# Patient Record
Sex: FEMALE | Race: WHITE | Employment: OTHER | URBAN - METROPOLITAN AREA
[De-identification: names, ages, dates, MRNs, and addresses within clinical notes are randomized per-mention and may not be internally consistent; named-entity substitution may affect disease eponyms.]

---

## 2017-08-15 ENCOUNTER — HOSPITAL ENCOUNTER (OUTPATIENT)
Dept: MAMMOGRAPHY | Age: 67
Discharge: HOME | End: 2017-08-15
Attending: FAMILY MEDICINE
Payer: MEDICARE

## 2017-08-15 DIAGNOSIS — Z12.31 VISIT FOR SCREENING MAMMOGRAM: ICD-10-CM

## 2017-08-15 PROCEDURE — 77063 BREAST TOMOSYNTHESIS BI: CPT

## 2018-08-16 ENCOUNTER — HOSPITAL ENCOUNTER (OUTPATIENT)
Dept: MAMMOGRAPHY | Age: 68
Discharge: HOME | End: 2018-08-16
Attending: FAMILY MEDICINE
Payer: MEDICARE

## 2018-08-16 DIAGNOSIS — Z12.31 VISIT FOR SCREENING MAMMOGRAM: ICD-10-CM

## 2018-08-16 PROCEDURE — 77063 BREAST TOMOSYNTHESIS BI: CPT

## 2019-01-18 ENCOUNTER — HOSPITAL ENCOUNTER (OUTPATIENT)
Dept: GENERAL RADIOLOGY | Age: 69
Discharge: HOME | End: 2019-01-18
Payer: MEDICARE

## 2019-01-18 DIAGNOSIS — G89.29 CHRONIC LUMBAR PAIN: ICD-10-CM

## 2019-01-18 DIAGNOSIS — M54.50 CHRONIC LUMBAR PAIN: ICD-10-CM

## 2019-01-18 PROCEDURE — 72100 X-RAY EXAM L-S SPINE 2/3 VWS: CPT

## 2019-10-02 ENCOUNTER — HOSPITAL ENCOUNTER (OUTPATIENT)
Dept: MAMMOGRAPHY | Age: 69
Discharge: HOME | End: 2019-10-02
Attending: FAMILY MEDICINE
Payer: MEDICARE

## 2019-10-02 DIAGNOSIS — Z12.31 VISIT FOR SCREENING MAMMOGRAM: ICD-10-CM

## 2019-10-02 PROCEDURE — 77063 BREAST TOMOSYNTHESIS BI: CPT

## 2020-04-29 ENCOUNTER — VIRTUAL VISIT (OUTPATIENT)
Dept: ORTHOPEDIC SURGERY | Age: 70
End: 2020-04-29

## 2020-04-29 DIAGNOSIS — M48.062 SPINAL STENOSIS OF LUMBAR REGION WITH NEUROGENIC CLAUDICATION: Primary | ICD-10-CM

## 2020-04-29 RX ORDER — COLESEVELAM 180 1/1
TABLET ORAL
COMMUNITY
Start: 2020-03-18

## 2020-04-29 RX ORDER — ESCITALOPRAM OXALATE 20 MG/1
TABLET ORAL
COMMUNITY
Start: 2020-03-06

## 2020-04-29 RX ORDER — GABAPENTIN 300 MG/1
300 CAPSULE ORAL 3 TIMES DAILY
Qty: 90 CAP | Refills: 5 | Status: SHIPPED | OUTPATIENT
Start: 2020-04-29 | End: 2020-06-02 | Stop reason: DRUGHIGH

## 2020-04-29 RX ORDER — ROSUVASTATIN CALCIUM 5 MG/1
2.5 TABLET, COATED ORAL DAILY
COMMUNITY
Start: 2020-02-08

## 2020-04-29 RX ORDER — TRIMETHOPRIM 100 MG/1
TABLET ORAL
COMMUNITY
Start: 2020-02-08

## 2020-04-29 RX ORDER — BACLOFEN 10 MG/1
TABLET ORAL
COMMUNITY
Start: 2020-03-05

## 2020-04-29 NOTE — PROGRESS NOTES
[address], [address]  [place], [address] [place] [address] Street  Phone: [phone]  Fax: [phone]        Duglas Baigar  : 1950  PCP: Jj Samaniego MD  2020    NEW PATIENT    Consent: Fidelia Garrett is a 68 y.o. female who was seen by synchronous (real-time) audio-video technology, and/or her healthcare decision maker, is aware that this patient-initiated, Telehealth encounter on 2020 is a billable service, with coverage as determined by his/her insurance carrier. He/She is aware that he/she may receive a bill and has provided verbal consent to proceed: Yes. The visit was conducted in the office with the patient being in home through a Doxy platform. Visit video time start: 11:04AM end: 11:41AM      HISTORY OF PRESENT ILLNESS  Fidelia Garrett is a 68 y.o. female c/o chronic low back pain and leg pain. Her issues have been worsening over time. She appears to have 2 separate issues. One is that she has back and leg pain(R>L) after standing for a period of time. 5-10 minutes is enough to encourage her to sit down. Afterwards, her symptoms appear to resolve. 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 This is more of an axial pain that causes an episodic sharp pain in her back she feels is consistent with muscle spasm. Her pain at rest is a 4/10. She has no associated weakness or sensory deficit. She has been prescribed muscle relaxers, but has not taken them. She is using tylenol with some benefit. She had some x-rays of the lumbar spine done over a year ago. It reportedly said that there was facet arthropathy and disc degeneration. ASSESSMENT  Her low back and leg pain may be multifactorial. 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  Her leg symptoms and flexed forward posture are likely related to spinal stenosis due to her claudication symptoms. Her arthropathy is likely asymptomatic at this time. PLAN  1. Gabapentin 300mg TID ramp  2. Physical therapy will be ordered once social distancing guidelines relax  3. Continue acetaminophen PRN  4. Continue HEP provided by PCP      Pt will f/u in 4 weeks or sooner if needed. Diagnoses and all orders for this visit:    1. Spinal stenosis of lumbar region with neurogenic claudication  -     gabapentin (Neurontin) 300 mg capsule; Take 1 Cap by mouth three (3) times daily. Max Daily Amount: 900 mg. CHIEF COMPLAINT  Jason Hopper is seen today in consultation at the request of Shu Bailey MD for complaints of low back and leg pain       PAST MEDICAL HISTORY   Past Medical History:   Diagnosis Date    Dysplasia of one kidney     Hyperlipidemia        Past Surgical History:   Procedure Laterality Date    HX HYSTERECTOMY         MEDICATIONS        ALLERGIES  No Known Allergies       SOCIAL HISTORY    Social History     Socioeconomic History    Marital status:      Spouse name: Not on file    Number of children: Not on file    Years of education: Not on file    Highest education level: Not on file   Tobacco Use    Smoking status: Never Smoker    Smokeless tobacco: Never Used   Substance and Sexual Activity    Alcohol use: Never     Frequency: Never    Drug use: Never       FAMILY HISTORY    Family History   Problem Relation Age of Onset    Hypertension Mother     Hypertension Father     Hypertension Sister     Diabetes Sister     Hypertension Brother     Diabetes Brother          REVIEW OF SYSTEMS  Review of Systems   Musculoskeletal: Positive for back pain. PHYSICAL EXAMINATION  There were no vitals taken for this visit.       No flowsheet data found.      -Constitutional: Healthy appearing, well developed, alert, in no acute distress  - Eyes: Conjunctiva clear, lids unremarkable, sclera anicteric  - Ears, nose, mouth, and throat: External inspection head, face, ears and nose identifies normal appearance without obvious deformity.  -Neck: No asymmetry, no masses, trachea is midline, and normal overall appearance.  -Respiratory: Respirations are even and unlabored. -Musculoskeletal: No cyanosis, clubbing, or edema observed  -Musculoskeletal: Able to walk without assistance with normal gait pattern  -Musculoskeletal: Inspection of the following identifies no gross deformity, misalignment, or asymmetry:   -Head/neck   -Spine   -Ribs/pelvis   -Right upper extremity    -Left upper extremity    -Right lower extremity  -Left lower extremity  -Musculoskeletal: Assessment of range of motion of the following identifies no gross limitations:    -Head/neck   -Spine   -Ribs/pelvis   -Right upper extremity    -Left upper extremity    -Right lower extremity  -Left lower extremity  -Musculoskeletal: lumbar extension is nonprovocative  -Musculoskeletal: flexed forward posture, difficulty with sit to stand transfer  -Skin: Head and neck skin with no lesions or rash  -Neurologic: Cranial nerves 3-12 grossly intact.  -Neurologic: negative SLR bilaterally  -Psychiatric: Judgement and insight intact. The limitations of a telemedicine visit including the inability to perform a detailed physical examination may miss significant findings was presented to the patient, and the patient still elected to proceed with the telemedicine visit. RADIOGRAPHS  None available for review     reviewed    Ms. Jocy Greene has a reminder for a \"due or due soon\" health maintenance. I have asked that she contact her primary care provider for follow-up on this health maintenance.      Pursuant to the emergency declaration under the [address] [place] [address] and the [employer] and [employer] Act, this Virtual  Visit was conducted, with patient's consent, to reduce the patient's risk of exposure to COVID-19 and provide continuity of care for an established patient. Services were provided through a video synchronous discussion virtually to substitute for in-person clinic visit.     CPT Codes 84010-44662 for Established Patients may apply to this Telehealth Visit  Time-based coding, delete if not needed: I spent at least 30 minutes with this new patient, and >50% of the time was spent counseling and/or coordinating care regarding low back and leg pain, spinal stenosis, medications, activity, HEP

## 2020-05-29 ENCOUNTER — TELEPHONE (OUTPATIENT)
Dept: ORTHOPEDIC SURGERY | Age: 70
End: 2020-05-29

## 2020-05-29 NOTE — TELEPHONE ENCOUNTER
Patient was to complete conservative treatment first. Needs FU with Dr. Jose Ramon Black before an MRI will be ordered

## 2020-05-29 NOTE — TELEPHONE ENCOUNTER
Pt called to make sure that order for mri was put in and that a fu be scheduled after.  Please call pt to advise she cancelled her 6/2/20 appt due to not having mri done yet

## 2020-06-02 ENCOUNTER — VIRTUAL VISIT (OUTPATIENT)
Dept: ORTHOPEDIC SURGERY | Age: 70
End: 2020-06-02

## 2020-06-02 DIAGNOSIS — G89.29 CHRONIC PRIMARY MUSCULOSKELETAL PAIN: ICD-10-CM

## 2020-06-02 DIAGNOSIS — M79.18 MYOFASCIAL PAIN: ICD-10-CM

## 2020-06-02 DIAGNOSIS — M54.59 MECHANICAL LOW BACK PAIN: ICD-10-CM

## 2020-06-02 DIAGNOSIS — M79.18 CHRONIC PRIMARY MUSCULOSKELETAL PAIN: ICD-10-CM

## 2020-06-02 DIAGNOSIS — M48.062 SPINAL STENOSIS OF LUMBAR REGION WITH NEUROGENIC CLAUDICATION: Primary | ICD-10-CM

## 2020-06-02 RX ORDER — GABAPENTIN 300 MG/1
600 CAPSULE ORAL 3 TIMES DAILY
Qty: 180 CAP | Refills: 2 | Status: SHIPPED | OUTPATIENT
Start: 2020-06-02 | End: 2020-11-12 | Stop reason: DRUGHIGH

## 2020-06-02 NOTE — PROGRESS NOTES
[address]  [address], [address]  [place], [address] [place] [address] Street  Phone: [phone]  Fax: [phone]        Georgie BarrazaCallaway  : 1950  PCP: Lilly Lagunas MD  2020    PROGRESS NOTE    Consent: Makenzie Faria is a 68 y.o. female who was seen by synchronous (real-time) audio-video technology, and/or her healthcare decision maker, is aware that this patient-initiated, Telehealth encounter on 2020 is a billable service, with coverage as determined by his/her insurance carrier. He/She is aware that he/she may receive a bill and has provided verbal consent to proceed: Yes. The visit was conducted in the office with the patient being in home through a Doxy platform. Visit video time start: 1:25 PM end: 1:36 PM      HISTORY OF PRESENT ILLNESS  Makenzie Faria is a 68 y.o. female who was seen as a new patient 2020 with c/o chronic low back pain and leg pain. Her issues were progressive. She appeared to have 2 separate issues. One is that she had back and leg pain(R>L) after standing for a period of time. 5-10 minutes was enough to encourage her to sit down. Afterwards, her symptoms appeared to resolve. 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 This was more of an axial pain that caused an episodic sharp pain in her back she felt was consistent with muscle spasm. Her pain at rest was a 4/10. She had no associated weakness or sensory deficit. She had been prescribed muscle relaxers, but had not taken them. She was using tylenol with some benefit. She had some x-rays of the lumbar spine done over a year ago. It reportedly said that there was facet arthropathy and disc degeneration. Makenzie Faria is seen virtually for f/u. She has increased her Gabapentin at night to 600 mg but continues to take 300 mg during the morning and day. She noticed some improvement, but continues to have pain. Pain Scale: /10    Treatments patient has tried:  Physical therapy:Pending  Doing HEP: Unknown  Non-opioid medications: Yes  Spinal injections: No  Spinal surgery- No.   Last Lumbar MRI - None     reviewed. ASSESSMENT  This is a 68year-old female with lumbar pain radiating into the BLE (R>L). Her low back and leg pain may be multifactorial. 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 Her leg symptoms and flexed forward posture are likely related to spinal stenosis due to her claudication symptoms. Her arthropathy is likely asymptomatic at this time. PLAN  1. Referral to PT [facility])  2. Increase Gabapentin to 600 mg TID    Pt will f/u in 8 weeks or sooner as needed. Diagnoses and all orders for this visit:    1. Spinal stenosis of lumbar region with neurogenic claudication  -     REFERRAL TO PHYSICAL THERAPY  -     gabapentin (NEURONTIN) 300 mg capsule; Take 2 Caps by mouth three (3) times daily. Max Daily Amount: 1,800 mg.    2. Chronic primary musculoskeletal pain  -     REFERRAL TO PHYSICAL THERAPY    3. Myofascial pain  -     REFERRAL TO PHYSICAL THERAPY    4. Mechanical low back pain  -     REFERRAL TO PHYSICAL THERAPY       Follow-up and Dispositions    · Return in about 8 weeks (around 7/28/2020). PAST MEDICAL HISTORY   Past Medical History:   Diagnosis Date    Dysplasia of one kidney     Hyperlipidemia        Past Surgical History:   Procedure Laterality Date    HX HYSTERECTOMY      HX OOPHORECTOMY     . MEDICATIONS      Current Outpatient Medications   Medication Sig Dispense Refill    gabapentin (NEURONTIN) 300 mg capsule Take 2 Caps by mouth three (3) times daily.  Max Daily Amount: 1,800 mg. 180 Cap 2    baclofen (LIORESAL) 10 mg tablet TAKE 1 TABLET BY MOUTH THREE TIMES DAILY AS NEEDED      colesevelam (WELCHOL) 625 mg tablet       escitalopram oxalate (LEXAPRO) 20 mg tablet       rosuvastatin (CRESTOR) 5 mg tablet       trimethoprim (TRIMPEX) 100 mg tablet           ALLERGIES  No Known Allergies       SOCIAL HISTORY    Social History     Socioeconomic History    Marital status:      Spouse name: Not on file    Number of children: Not on file    Years of education: Not on file    Highest education level: Not on file   Tobacco Use    Smoking status: Never Smoker    Smokeless tobacco: Never Used   Substance and Sexual Activity    Alcohol use: Never     Frequency: Never    Drug use: Never   Social History Narrative    ** Merged History Encounter **            FAMILY HISTORY    Family History   Problem Relation Age of Onset    Hypertension Mother     Hypertension Father     Hypertension Sister     Diabetes Sister     Hypertension Brother     Diabetes Brother          REVIEW OF SYSTEMS  Review of Systems   Musculoskeletal: Positive for back pain. BLE paraesthesia (R>L)        PHYSICAL EXAMINATION  There were no vitals taken for this visit. No flowsheet data found.        -Constitutional: Healthy appearing, well developed, alert, in no acute distress  - Eyes: Conjunctiva clear, lids unremarkable, sclera anicteric  - Ears, nose, mouth, and throat: External inspection head, face, ears and nose identifies normal appearance without obvious deformity.  -Neck: No asymmetry, no masses, trachea is midline, and normal overall appearance.  -Respiratory: Respirations are even and unlabored. -Musculoskeletal: No cyanosis, clubbing, or edema observed  -Musculoskeletal: Inspection of the following identifies no gross deformity, misalignment, or asymmetry:   -Head/neck   -Spine   -Ribs/pelvis   -Right upper extremity    -Left upper extremity  -Musculoskeletal: Assessment of range of motion of the following identifies no gross limitations:    -Head/neck   -Spine   -Ribs/pelvis   -Skin: Head and neck skin with no lesions or rash  -Neurologic: Cranial nerves 3-12 grossly intact.   -Psychiatric: Judgement and insight intact. The limitations of a telemedicine visit including the inability to perform a detailed physical examination may miss significant findings was presented to the patient, and the patient still elected to proceed with the telemedicine visit. RADIOGRAPHS  Lumbar XR images taken on 1/18/19 personally reviewed with patient:  3 views of the lumbar spine obtained. Lumbar dextrocurvature apex L2,  slightly limits exam. Vertebral body heights appear preserved. Multilevel mild  disc space loss and chronic appearing endplate changes. Mild retrolisthesis of  L2 on L3 and L4 and L5, difficult to measure due to obliquity of lateral view. Lumbar lordosis maintained. Lower lumbar facet arthropathy. Multilevel mid to  lower lumbar spinous process abutment.     IMPRESSION  IMPRESSION:     No clearly acute findings. Degenerative changes as above. Mild levocurvature  apex L2. Mild retrolisthesis of L2 on L3 and L4 on L5.  reviewed    Ms. Miriam Ortiz has a reminder for a \"due or due soon\" health maintenance. I have asked that she contact her primary care provider for follow-up on this health maintenance. Pursuant to the emergency declaration under the [address] [place] [address], [facility] waiver authority and the [employer] Act, this Virtual  Visit was conducted, with patient's consent, to reduce the patient's risk of exposure to COVID-19 and provide continuity of care for an established patient. Services were provided through a video synchronous discussion virtually to substitute for in-person clinic visit. CPT Codes 36122-24684 for Established Patients may apply to this Telehealth Visit  Time-based coding, delete if not needed: I spent at least 10 minutes with this established patient, and >50% of the time was spent counseling and/or coordinating care. Written by Indigo Torres, as dictated by Dr. Elizabeth Ronquillo. I, Dr. Madeline Aden, confirm that all documentation is accurate.

## 2020-06-02 NOTE — PATIENT INSTRUCTIONS
Gabapentin (Neurontin) instructions: We will increase your current dose from 300 mg (1 pill) three times a day to 600 mg (2 pills) three times a day. Morning Afternoon Night Week 1 1 pill 1 pill 2 pills Week 2 2 pills 1 pill 2 pills Week 3 and onwards 2 pills 2 pills 2 pills Week 1: Take an additional 300 mg pill at night to your current dose so that you are taking 1 pill in the morning, 1 pill in the afternoon, and 2 pills at night. Week 2: Take an additional 300 mg pill in the morning so that you are taking 2 pills in the morning, 1 pill in the afternoon, and 2 pills at night. Week 3 and onwards: Take an additional 300 mg pill in the afternoon so that you are taking 2 pills in the morning, 2 pills in the afternoon, and 2 pills at night. Continue taking this dose each day.

## 2020-06-08 ENCOUNTER — HOSPITAL ENCOUNTER (OUTPATIENT)
Dept: PHYSICAL THERAPY | Age: 70
Discharge: HOME | End: 2020-06-08
Payer: MEDICARE

## 2020-06-08 PROCEDURE — 97535 SELF CARE MNGMENT TRAINING: CPT

## 2020-06-08 PROCEDURE — 97162 PT EVAL MOD COMPLEX 30 MIN: CPT

## 2020-06-08 PROCEDURE — 97110 THERAPEUTIC EXERCISES: CPT

## 2020-06-08 NOTE — PROGRESS NOTES
PT DAILY TREATMENT NOTE 10-18    Patient Name: Anamaria Fung  Date:2020  : 1950  [x]  Patient  Verified  Payor: [place] MEDICARE / Plan: [place] MEDICARE PART A & B / Product Type: Medicare /    In PFIL:0523  Out time:1000  Total Treatment Time (min): 45  Visit #: 1 of -8    Medicare/BCBS Only   Total Timed Codes (min):  25 1:1 Treatment Time:  45       Treatment Area: Spinal stenosis, lumbar region with neurogenic claudication [M48.062]  Myalgia, other site [M79.18]  Low back pain [M54.5]    SUBJECTIVE  Pain Level (0-10 scale):  2  Any medication changes, allergies to medications, adverse drug reactions, diagnosis change, or new procedure performed?: [x] No    [] Yes (see summary sheet for update)  Subjective functional status/changes:   [] No changes reported       OBJECTIVE    Modality rationale:    Min Type Additional Details    [] Estim:  []Unatt       []IFC  []Premod                        []Other:  []w/ice   []w/heat  Position:  Location:    [] Estim: []Att    []TENS instruct  []NMES                    []Other:  []w/US   []w/ice   []w/heat  Position:  Location:    []  Traction: [] Cervical       []Lumbar                       [] Prone          []Supine                       []Intermittent   []Continuous Lbs:  [] before manual  [] after manual    []  Ultrasound: []Continuous   [] Pulsed                           []1MHz   []3MHz W/cm2:  Location:    []  Iontophoresis with dexamethasone         Location: [] Take home patch   [] In clinic    []  Ice     []  heat  []  Ice massage  []  Laser   []  Anodyne Position:  Location:    []  Laser with stim  []  Other:  Position:  Location:    []  Vasopneumatic Device Pressure:       [] lo [] med [] hi   Temperature: [] lo [] med [] hi   [] Skin assessment post-treatment:  []intact []redness- no adverse reaction    []redness  adverse reaction:     20 min [x]Eval                  []Re-Eval       17 min Therapeutic Exercise:  [] See flow sheet : HEP   Rationale: increase ROM and increase strength to improve the patients ability to perform ADL    8 min Self Care/Home Management: body mechanics, pain control with modalities at home   Rationale: decrease pain  to improve the patients ability to perform ADL            With   [] TE   [] TA   [] neuro   [] other: Patient Education: [x] Review HEP    [] Progressed/Changed HEP based on:   [] positioning   [x] body mechanics   [] transfers   [x] heat/ice application    [] other:      Other Objective/Functional Measures:       Pain Level (0-10 scale) post treatment: 2    ASSESSMENT/Changes in Function:      Patient will continue to benefit from skilled PT services to modify and progress therapeutic interventions, address functional mobility deficits, address ROM deficits, address strength deficits, analyze and address soft tissue restrictions, analyze and cue movement patterns and analyze and modify body mechanics/ergonomics to attain remaining goals. [x]  See Plan of Care  []  See progress note/recertification  []  See Discharge Summary         Progress towards goals / Updated goals:  Short Term Goals: To be accomplished in 1 weeks:   1. Pt will be I and compliant with HEP   IE- issued HEP  Long Term Goals: To be accomplished in 4 weeks:   1. Improve FOTO score to 59 for improved ability for daily tasks. IE- 48   2. The pt will report being able to perform moderate activities at home (like vacuuming) with no limitation   IE- limited a little. 3. The pt will demonstrate 4/5 glute max strength to diminish lumbar strain and improve ability for daily activities. IE- 3+/5    4. The pt will report no difficulty with performing her usual housework   IE- limited a little. PLAN  []  Upgrade activities as tolerated     [x]  Continue plan of care  []  Update interventions per flow sheet       []  Discharge due to:_  []  Other:_      Cliff Parents, PT 6/8/2020  9:15 AM    No future appointments.

## 2020-06-08 NOTE — PROGRESS NOTES
[facility]  [address]  [place], [address]  [phone] fax    Plan of Care/ Statement of Necessity for Physical Therapy Services    Patient name: Matthew Gresham Start of Care: 2020   Referral source: Ning Peralta MD : 1950    Medical Diagnosis: Spinal stenosis, lumbar region with neurogenic claudication [M48.062]  Myalgia, other site [M79.18]  Low back pain [M54.5]  Payor: [place] MEDICARE / Plan: VA MEDICARE PART A & B / Product Type: Medicare /  Onset Date:2020    Treatment Diagnosis: LBP   Prior Hospitalization: see medical history Provider#: [id number]   Medications: Verified on Patient summary List    Comorbidities: OA, depression   Prior Level of Function: functionally I with daily activities. The Plan of Care and following information is based on the information from the initial evaluation. Assessment/ key information: 69 y/o female presents with c/o LBP and pain down B LEs into her feet. She denies numbness or tingling. 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 The pt demonstrates limited lumbar AROM in all planes of motion with pain with all planes. The pt demonstrates limited B hip mobility, limited B LE flexibility, limited core engagement, limited B glute strength, + tenderness to palpation noted over the lumbar paraspinals,and decreased segmental mobility noted. The pt will benefit from PT to address the aforementioned impairments. Evaluation Complexity History MEDIUM  Complexity : 1-2 comorbidities / personal factors will impact the outcome/ POC ; Examination MEDIUM Complexity : 3 Standardized tests and measures addressing body structure, function, activity limitation and / or participation in recreation  ;Presentation MEDIUM Complexity : Evolving with changing characteristics  ; Clinical Decision Making MEDIUM Complexity : FOTO score of 26-74  Overall Complexity Rating: MEDIUM  Problem List: pain affecting function, decrease ROM, decrease strength, impaired gait/ balance, decrease ADL/ functional abilitiies, decrease activity tolerance and decrease flexibility/ joint mobility   Treatment Plan may include any combination of the following: Therapeutic exercise, Therapeutic activities, Neuromuscular re-education, Physical agent/modality, Manual therapy, Patient education and Self Care training  Patient / Family readiness to learn indicated by: asking questions, trying to perform skills and interest  Persons(s) to be included in education: patient (P)  Barriers to Learning/Limitations: None  Patient Goal (s): To have less pain  Patient Self Reported Health Status: good  Rehabilitation Potential: good    Short Term Goals: To be accomplished in 1 weeks:   1. Pt will be I and compliant with HEP     Long Term Goals: To be accomplished in 4 weeks:   1. Improve FOTO score to 59 for improved ability for daily tasks. 2. The pt will report being able to perform moderate activities at home (like vacuuming) with no limitation   3. The pt will demonstrate 4/5 glute max strength to diminish lumbar strain and improve ability for daily activities. 4. The pt will report no difficulty with performing her usual housework    Frequency / Duration: Patient to be seen 2 times per week for 4 weeks. Patient/ Caregiver education and instruction: Diagnosis, prognosis, self care, activity modification and exercises   [x]  Plan of care has been reviewed with PTA    Certification Period: 6-8-2020 to 7-7-2020  Leonarda Rees, PT 6/8/2020 9:16 AM    ________________________________________________________________________    I certify that the above Therapy Services are being furnished while the patient is under my care. I agree with the treatment plan and certify that this therapy is necessary.     [de-identified] Signature:____________________  Date:____________Time: _________    Please sign and return to [facility]  [address]  [place], [address]  [phone] fax

## 2020-06-12 ENCOUNTER — HOSPITAL ENCOUNTER (OUTPATIENT)
Dept: PHYSICAL THERAPY | Age: 70
Discharge: HOME | End: 2020-06-12
Payer: MEDICARE

## 2020-06-12 PROCEDURE — 97140 MANUAL THERAPY 1/> REGIONS: CPT

## 2020-06-12 PROCEDURE — 97110 THERAPEUTIC EXERCISES: CPT

## 2020-06-12 NOTE — PROGRESS NOTES
PT DAILY TREATMENT NOTE 10-18    Patient Name: Martinez Caldwell  Date:2020  : 1950  [x]  Patient  Verified  Payor: M[employer] Alt / Plan: [place] / Product Type: Medicare /    In time:11:14  Out time:12:10  Total Treatment Time (min): 56  Visit #: 2 of 4-8    Medicare/BCBS Only   Total Timed Codes (min):  46 1:1 Treatment Time:  35       Treatment Area: Low back pain [M54.5]    SUBJECTIVE  Pain Level (0-10 scale): 2  Any medication changes, allergies to medications, adverse drug reactions, diagnosis change, or new procedure performed?: [x] No    [] Yes (see summary sheet for update)  Subjective functional status/changes:   [] No changes reported  The pt reports she has been trying HEP.      OBJECTIVE    Modality rationale: decrease pain and increase tissue extensibility to improve the patients ability to perform ADL   Min Type Additional Details    [] Estim:  []Unatt       []IFC  []Premod                        []Other:  []w/ice   []w/heat  Position:  Location:    [] Estim: []Att    []TENS instruct  []NMES                    []Other:  []w/US   []w/ice   []w/heat  Position:  Location:    []  Traction: [] Cervical       []Lumbar                       [] Prone          []Supine                       []Intermittent   []Continuous Lbs:  [] before manual  [] after manual    []  Ultrasound: []Continuous   [] Pulsed                           []1MHz   []3MHz W/cm2:  Location:    []  Iontophoresis with dexamethasone         Location: [] Take home patch   [] In clinic   10 []  Ice     [x]  heat  []  Ice massage  []  Laser   []  Anodyne Position:prone  Location:lumbar    []  Laser with stim  []  Other:  Position:  Location:    []  Vasopneumatic Device Pressure:       [] lo [] med [] hi   Temperature: [] lo [] med [] hi   [] Skin assessment post-treatment:  []intact []redness- no adverse reaction    []redness  adverse reaction:     30 min Therapeutic Exercise:  [x] See flow sheet :   Rationale: increase ROM and increase strength to improve the patients ability to perform ADL     8 min Neuromuscular Re-education:  [x]  See flow sheet : Trap bar exercises, PPT for engagement   Rationale: increase strength, improve coordination and increase proprioception  to improve the patients ability to perform ADL    8 min Manual Therapy:  Lumbar PAs, STM paraspinals   Rationale: decrease pain, increase ROM and increase tissue extensibility to perform daily tasks. With   [] TE   [] TA   [] neuro   [] other: Patient Education: [x] Review HEP    [] Progressed/Changed HEP based on:   [] positioning   [] body mechanics   [] transfers   [] heat/ice application    [] other:      Other Objective/Functional Measures: initiated therex per flow sheet, + cues for engagement of core     Pain Level (0-10 scale) post treatment: 2    ASSESSMENT/Changes in Function:  The pt has difficulty engaging core with therex, requiring cues. No complaint during treatment of pain increase or discomfort. Patient will continue to benefit from skilled PT services to modify and progress therapeutic interventions, address functional mobility deficits, address ROM deficits, address strength deficits, analyze and address soft tissue restrictions, analyze and cue movement patterns and analyze and modify body mechanics/ergonomics to attain remaining goals. []  See Plan of Care  []  See progress note/recertification  []  See Discharge Summary         Progress towards goals / Updated goals:  Short Term Goals: To be accomplished in 1 weeks:              1. Pt will be I and compliant with HEP              IE- issued HEP   Current: reports compliance on 6-12-20  Long Term Goals: To be accomplished in 4 weeks:              1. Improve FOTO score to 59 for improved ability for daily tasks.                IE- 48              2. The pt will report being able to perform moderate activities at home (like vacuuming) with no limitation IE- limited a little. 3. The pt will demonstrate 4/5 glute max strength to diminish lumbar strain and improve ability for daily activities.               IE- 3+/5                         4. The pt will report no difficulty with performing her usual housework              IE- limited a little.        PLAN  []  Upgrade activities as tolerated     [x]  Continue plan of care  []  Update interventions per flow sheet       []  Discharge due to:_  []  Other:_      Tangela Seo, PT 6/12/2020  11:35 AM    Future Appointments   Date Time Provider Joann Ambrizi   6/15/2020 10:15 AM Tahir, [address] [facility]   6/17/2020  9:45 AM Leda Friends [facility]   6/22/2020  9:15 AM Korina Courtney, PT [facility]   6/24/2020  9:45 AM Korina Courtney, PT [facility]   6/29/2020  9:15 AM Korina Courtney, PT [facility]   7/1/2020  9:45 AM Arline Gay, PT [facility]

## 2020-06-15 ENCOUNTER — HOSPITAL ENCOUNTER (OUTPATIENT)
Dept: PHYSICAL THERAPY | Age: 70
Discharge: HOME | End: 2020-06-15
Payer: MEDICARE

## 2020-06-15 PROCEDURE — 97140 MANUAL THERAPY 1/> REGIONS: CPT

## 2020-06-15 PROCEDURE — 97112 NEUROMUSCULAR REEDUCATION: CPT

## 2020-06-15 PROCEDURE — 97110 THERAPEUTIC EXERCISES: CPT

## 2020-06-15 NOTE — PROGRESS NOTES
PT DAILY TREATMENT NOTE 10-18    Patient Name: Ayesha Jarrett  Date:6/15/2020  : 1950  [x]  Patient  Verified  Payor: Bonnie Lopezors / Plan: [place] MEDICARE PART A & B / Product Type: Medicare /    In time:10:12  Out time:10:51  Total Treatment Time (min): 39  Visit #: 3 of 4-8    Medicare/BCBS Only   Total Timed Codes (min):  39 1:1 Treatment Time:  39       Treatment Area: Low back pain [M54.5]    SUBJECTIVE  Pain Level (0-10 scale): 2  Any medication changes, allergies to medications, adverse drug reactions, diagnosis change, or new procedure performed?: [x] No    [] Yes (see summary sheet for update)  Subjective functional status/changes:   [] No changes reported  The pt reports no adverse response to first f/u session. Reports she can get out of a chair easier without \"wanting to cry\".  She reports arthritis which may be contributing to pain today    OBJECTIVE    Modality rationale: PD to improve the patients ability to    Min Type Additional Details    [] Estim:  []Unatt       []IFC  []Premod                        []Other:  []w/ice   []w/heat  Position:  Location:    [] Estim: []Att    []TENS instruct  []NMES                    []Other:  []w/US   []w/ice   []w/heat  Position:  Location:    []  Traction: [] Cervical       []Lumbar                       [] Prone          []Supine                       []Intermittent   []Continuous Lbs:  [] before manual  [] after manual    []  Ultrasound: []Continuous   [] Pulsed                           []1MHz   []3MHz W/cm2:  Location:    []  Iontophoresis with dexamethasone         Location: [] Take home patch   [] In clinic    []  Ice     []  heat  []  Ice massage  []  Laser   []  Anodyne Position:  Location:    []  Laser with stim  []  Other:  Position:  Location:    []  Vasopneumatic Device Pressure:       [] lo [] med [] hi   Temperature: [] lo [] med [] hi   [] Skin assessment post-treatment:  []intact []redness- no adverse reaction    []redness  adverse reaction: 23 min Therapeutic Exercise:  [] See flow sheet :   Rationale: increase ROM and increase strength to improve the patients ability to improve ease of daily task performance      8 min Neuromuscular Re-education:  []  See flow sheet : trap bar ex's, PPT   Rationale: improve coordination and increase proprioception  to improve the patients ability to activate anterior chain musculature for reduced lumbar strain with functional tasks     8 min Manual Therapy:  Lumbar PA's grade II-III, STM B lumbar paraspinals (pt in prone declined pillow prop)   Rationale: increase ROM and increase tissue extensibility to improve ease of ADL performance and self care       With   [] TE   [] TA   [] neuro   [] other: Patient Education: [x] Review HEP    [] Progressed/Changed HEP based on:   [] positioning   [] body mechanics   [] transfers   [] heat/ice application    [] other:      Other Objective/Functional Measures: pt challenged with PPT unable to segmental control lumbar spine     Pain Level (0-10 scale) post treatment: 0    ASSESSMENT/Changes in Function: Pt with good response to all therex today reports no pain at end of session. She does have difficulty with segmental lumbar mobility and anterior chain activation. Continue to progress lumbar flexibility and anterior chain strength as able    Patient will continue to benefit from skilled PT services to modify and progress therapeutic interventions, address functional mobility deficits, address ROM deficits, address strength deficits, analyze and address soft tissue restrictions, analyze and cue movement patterns and analyze and modify body mechanics/ergonomics to attain remaining goals. []  See Plan of Care  []  See progress note/recertification  []  See Discharge Summary         Progress towards goals / Updated goals:  Short Term Goals: To be accomplished in 1 weeks:              1.  Pt will be I and compliant with HEP              IE- issued HEP              Current: reports compliance on 6-12-20  Long Term Goals: To be accomplished in 4 weeks:              1. Improve FOTO score to 59 for improved ability for daily tasks.               IE- 53              2. The pt will report being able to perform moderate activities at home (like vacuuming) with no limitation              IE- limited a little.              3. The pt will demonstrate 4/5 glute max strength to diminish lumbar strain and improve ability for daily activities.             GG- 3+/5                         4. The pt will report no difficulty with performing her usual housework              IE- limited a little.      PLAN  []  Upgrade activities as tolerated     []  Continue plan of care  []  Update interventions per flow sheet       []  Discharge due to:_  []  Other:_      Ralph Jarrett DPT, CMTPT  6/15/2020  10:20 AM    Future Appointments   Date Time Provider Joann Yates   6/17/2020  9:45 AM [facility]   6/22/2020  9:15 AM Korina Courtney, PT [facility]   6/24/2020  9:45 AM Korina Courtney, PT [facility]   6/29/2020  9:15 AM Korina Courtney, PT [facility]   7/1/2020  9:45 AM Arline Gay, PT [facility]

## 2020-06-17 ENCOUNTER — HOSPITAL ENCOUNTER (OUTPATIENT)
Dept: PHYSICAL THERAPY | Age: 70
Discharge: HOME | End: 2020-06-17
Payer: MEDICARE

## 2020-06-17 PROCEDURE — 97110 THERAPEUTIC EXERCISES: CPT

## 2020-06-17 PROCEDURE — 97140 MANUAL THERAPY 1/> REGIONS: CPT

## 2020-06-17 NOTE — PROGRESS NOTES
PT DAILY TREATMENT NOTE 10-18    Patient Name: Bette Dale  Date:2020  : 1950  [x]  Patient  Verified  Payor: [place] MEDICARE / Plan: [place] MEDICARE PART A & B / Product Type: Medicare /    In time:9:46  Out time:10:19  Total Treatment Time (min): 33  Visit #: 4 of 4-8    Medicare/BCBS Only   Total Timed Codes (min):  33 1:1 Treatment Time:  30       Treatment Area: Low back pain [M54.5]    SUBJECTIVE  Pain Level (0-10 scale): 1  Any medication changes, allergies to medications, adverse drug reactions, diagnosis change, or new procedure performed?: [x] No    [] Yes (see summary sheet for update)  Subjective functional status/changes:   [] No changes reported  The pt reports some improved pain levels today, no issues since last session    OBJECTIVE    Modality rationale: PD to improve the patients ability to    Min Type Additional Details    [] Estim:  []Unatt       []IFC  []Premod                        []Other:  []w/ice   []w/heat  Position:  Location:    [] Estim: []Att    []TENS instruct  []NMES                    []Other:  []w/US   []w/ice   []w/heat  Position:  Location:    []  Traction: [] Cervical       []Lumbar                       [] Prone          []Supine                       []Intermittent   []Continuous Lbs:  [] before manual  [] after manual    []  Ultrasound: []Continuous   [] Pulsed                           []1MHz   []3MHz W/cm2:  Location:    []  Iontophoresis with dexamethasone         Location: [] Take home patch   [] In clinic    []  Ice     []  heat  []  Ice massage  []  Laser   []  Anodyne Position:  Location:    []  Laser with stim  []  Other:  Position:  Location:    []  Vasopneumatic Device Pressure:       [] lo [] med [] hi   Temperature: [] lo [] med [] hi   [] Skin assessment post-treatment:  []intact []redness- no adverse reaction    []redness  adverse reaction:       17 min Therapeutic Exercise:  [] See flow sheet :   Rationale: increase ROM and increase strength to improve the patients ability to improve daily task performance with less back pain    8 min Neuromuscular Re-education:  []  See flow sheet : standing trap bar ex's    Rationale: increase strength, improve coordination and increase proprioception  to improve the patients trunk endurance and core stability to reduce stress on lumbar spine     8 min Manual Therapy:  Lumbar STM B paraspinals, PAs and UPA's lumbar spine (all in prone, pt declined pillow)   Rationale: increase ROM and increase tissue extensibility to improve ease of ADLs and self care performance            With   [] TE   [] TA   [] neuro   [] other: Patient Education: [x] Review HEP    [] Progressed/Changed HEP based on:   [] positioning   [] body mechanics   [] transfers   [] heat/ice application    [] other:      Other Objective/Functional Measures: pt with one instance of \"shooting\" pain through right lumbar region during [facility] performance, subsided with brief rest     Pain Level (0-10 scale) post treatment: 0    ASSESSMENT/Changes in Function: The pt was able to complete session with decreased pain post despite brief sharp pain with SKC. She demonstrates overall improving pain levels it appears. Will continue with core stability work and lumbar flexibility for improved ADLs    Patient will continue to benefit from skilled PT services to modify and progress therapeutic interventions, address functional mobility deficits, address ROM deficits, address strength deficits, analyze and address soft tissue restrictions, analyze and cue movement patterns and analyze and modify body mechanics/ergonomics to attain remaining goals. []  See Plan of Care  []  See progress note/recertification  []  See Discharge Summary         Progress towards goals / Updated goals:  Short Term Goals: To be accomplished in 1 weeks:              1.  Pt will be I and compliant with HEP              IE- issued HEP              MIGFLRT: reports compliance on 6-12-20  Long Term Goals: To be accomplished in 4 weeks:              1. Improve FOTO score to 59 for improved ability for daily tasks.               IE- 53              2. The pt will report being able to perform moderate activities at home (like vacuuming) with no limitation              IE- limited a little.              3. The pt will demonstrate 4/5 glute max strength to diminish lumbar strain and improve ability for daily activities.             RI- 3+/5                         4. The pt will report no difficulty with performing her usual housework              IE- limited a little.      PLAN  []  Upgrade activities as tolerated     [x]  Continue plan of care  []  Update interventions per flow sheet       []  Discharge due to:_  []  Other:_      Shabbir Calvert DPT, CMTPT 6/17/2020  10:03 AM    Future Appointments   Date Time Provider Joann Yates   6/22/2020  9:15 AM Almeta Merino, PT [facility]   6/24/2020  9:45 AM Almeta Merino, PT [facility]   6/29/2020  9:15 AM Almeta Merino, PT [facility]   7/1/2020  9:45 AM Marizol Pillai, PT [facility]

## 2020-06-22 ENCOUNTER — HOSPITAL ENCOUNTER (OUTPATIENT)
Dept: PHYSICAL THERAPY | Age: 70
Discharge: HOME | End: 2020-06-22
Payer: MEDICARE

## 2020-06-22 PROCEDURE — 97110 THERAPEUTIC EXERCISES: CPT

## 2020-06-22 PROCEDURE — 97140 MANUAL THERAPY 1/> REGIONS: CPT

## 2020-06-22 NOTE — PROGRESS NOTES
PT DAILY TREATMENT NOTE 10-18    Patient Name: David Nieves  Date:2020  : 1950  [x]  Patient  Verified  Payor: Gloria Powell / Plan: [place] MEDICARE PART A & B / Product Type: Medicare /    In [id number]  Out [id number]  Total Treatment Time (min): 35  Visit #: 5 of 8    Medicare/BCBS Only   Total Timed Codes (min):  35 1:1 Treatment Time:  35       Treatment Area: Low back pain [M54.5]    SUBJECTIVE  Pain Level (0-10 scale): 2  Any medication changes, allergies to medications, adverse drug reactions, diagnosis change, or new procedure performed?: [x] No    [] Yes (see summary sheet for update)  Subjective functional status/changes:   [] No changes reported  The pt reports last Wednesday she had pain increase and had another episode of pain increase after sitting for a while over the weekend. OBJECTIVE    Modality rationale:    Min Type Additional Details    [] Estim:  []Unatt       []IFC  []Premod                        []Other:  []w/ice   []w/heat  Position:  Location:    [] Estim: []Att    []TENS instruct  []NMES                    []Other:  []w/US   []w/ice   []w/heat  Position:  Location:    []  Traction: [] Cervical       []Lumbar                       [] Prone          []Supine                       []Intermittent   []Continuous Lbs:  [] before manual  [] after manual    []  Ultrasound: []Continuous   [] Pulsed                           []1MHz   []3MHz W/cm2:  Location:    []  Iontophoresis with dexamethasone         Location: [] Take home patch   [] In clinic    []  Ice     []  heat  []  Ice massage  []  Laser   []  Anodyne Position:  Location:    []  Laser with stim  []  Other:  Position:  Location:    []  Vasopneumatic Device Pressure:       [] lo [] med [] hi   Temperature: [] lo [] med [] hi   [] Skin assessment post-treatment:  []intact []redness- no adverse reaction    []redness  adverse reaction:       19 min Therapeutic Exercise:  []?  See flow sheet :   Rationale: increase ROM and increase strength to improve the patients ability to improve daily task performance with less back pain     8 min Neuromuscular Re-education:  []? See flow sheet : standing trap bar ex's    Rationale: increase strength, improve coordination and increase proprioception  to improve the patients trunk endurance and core stability to reduce stress on lumbar spine      8 min Manual Therapy:  Lumbar STM B paraspinals, PAs lumbar spine   Rationale: increase ROM and increase tissue extensibility to improve ease of ADLs and self care performance            With   [] TE   [] TA   [] neuro   [] other: Patient Education: [x] Review HEP    [] Progressed/Changed HEP based on:   [] positioning   [] body mechanics   [] transfers   [] heat/ice application    [] other:      Other Objective/Functional Measures: + cues with therex for appropriate form/technique     Pain Level (0-10 scale) post treatment: 1    ASSESSMENT/Changes in Function:  The pt is gaining relief with PT treatment sessions but does reports some episodes of increase pain still. Patient will continue to benefit from skilled PT services to modify and progress therapeutic interventions, address functional mobility deficits, address ROM deficits, address strength deficits, analyze and address soft tissue restrictions, analyze and cue movement patterns and analyze and modify body mechanics/ergonomics to attain remaining goals. []  See Plan of Care  []  See progress note/recertification  []  See Discharge Summary         Progress towards goals / Updated goals:  Short Term Goals: To be accomplished in 1 weeks:              1. Pt will be I and compliant with HEP              IE- issued HEP              QMIGHGH: reports compliance on 6-12-20  Long Term Goals: To be accomplished in 4 weeks:              1. Improve FOTO score to 59 for improved ability for daily tasks.               IE- 53              2.  The pt will report being able to perform moderate activities at home (like vacuuming) with no limitation              IE- limited a little.              3. The pt will demonstrate 4/5 glute max strength to diminish lumbar strain and improve ability for daily activities.             XD- 3+/5     Current: remains 3+/5 on 6-22-20                       4. The pt will report no difficulty with performing her usual housework              IE- limited a little.      PLAN  []  Upgrade activities as tolerated     [x]  Continue plan of care  []  Update interventions per flow sheet       []  Discharge due to:_  []  Other:_      Aneta Uribe, PT 6/22/2020  9:22 AM    Future Appointments   Date Time Provider Joann Yates   6[facility]24[facility]2020  [facility]   [facility]2020  9[facility]15 [facility]   7/1/2020  9:45 AM [facility]

## 2020-06-24 ENCOUNTER — HOSPITAL ENCOUNTER (OUTPATIENT)
Dept: PHYSICAL THERAPY | Age: 70
Discharge: HOME | End: 2020-06-24
Payer: MEDICARE

## 2020-06-24 PROCEDURE — 97112 NEUROMUSCULAR REEDUCATION: CPT

## 2020-06-24 PROCEDURE — 97140 MANUAL THERAPY 1/> REGIONS: CPT

## 2020-06-24 PROCEDURE — 97110 THERAPEUTIC EXERCISES: CPT

## 2020-06-24 NOTE — PROGRESS NOTES
PT DAILY TREATMENT NOTE 10-18    Patient Name: Martinez Caldwell  Date:2020  : 1950  [x]  Patient  Verified  Payor: [employer] / Plan: [place] & [place] / Product Type: Medicare /    In time:50  Out time:1030  Total Treatment Time (min): 40  Visit #: 6 of 8    Medicare/BCBS Only   Total Timed Codes (min):  40 1:1 Treatment Time:  40       Treatment Area: Low back pain [M54.5]    SUBJECTIVE  Pain Level (0-10 scale): 2  Any medication changes, allergies to medications, adverse drug reactions, diagnosis change, or new procedure performed?: [x] No    [] Yes (see summary sheet for update)  Subjective functional status/changes:   [] No changes reported  The pt reports she can stand longer but still gets pain with standing. OBJECTIVE    Modality rationale:    Min Type Additional Details    [] Estim:  []Unatt       []IFC  []Premod                        []Other:  []w/ice   []w/heat  Position:  Location:    [] Estim: []Att    []TENS instruct  []NMES                    []Other:  []w/US   []w/ice   []w/heat  Position:  Location:    []  Traction: [] Cervical       []Lumbar                       [] Prone          []Supine                       []Intermittent   []Continuous Lbs:  [] before manual  [] after manual    []  Ultrasound: []Continuous   [] Pulsed                           []1MHz   []3MHz W/cm2:  Location:    []  Iontophoresis with dexamethasone         Location: [] Take home patch   [] In clinic    []  Ice     []  heat  []  Ice massage  []  Laser   []  Anodyne Position:  Location:    []  Laser with stim  []  Other:  Position:  Location:    []  Vasopneumatic Device Pressure:       [] lo [] med [] hi   Temperature: [] lo [] med [] hi   [] Skin assessment post-treatment:  []intact []redness- no adverse reaction    []redness  adverse reaction:       24 min Therapeutic Exercise:  [x]? ? See flow sheet :   Rationale: increase ROM and increase strength to improve the patients ability to improve daily task performance with less back pain     8 min Neuromuscular Re-education:  []??  See flow sheet : standing trap bar and Core Align   Rationale: increase strength, improve coordination and increase proprioception  to improve the patients trunk endurance and core stability to reduce stress on lumbar spine      8 min Manual Therapy:  Lumbar STM B paraspinals, PAs lumbar spine   Rationale: increase ROM and increase tissue extensibility to improve ease of ADLs and self care performance            With   [] TE   [] TA   [] neuro   [] other: Patient Education: [x] Review HEP    [] Progressed/Changed HEP based on:   [] positioning   [] body mechanics   [] transfers   [] heat/ice application    [] other:      Other Objective/Functional Measures: added Core Align     Pain Level (0-10 scale) post treatment: 1    ASSESSMENT/Changes in Function:  The pt remains challenged with core engagement with therex. She is progressing as noted with improved standing tolerance but progress is slow. Patient will continue to benefit from skilled PT services to modify and progress therapeutic interventions, address functional mobility deficits, address ROM deficits, address strength deficits, analyze and address soft tissue restrictions, analyze and cue movement patterns and analyze and modify body mechanics/ergonomics to attain remaining goals. []  See Plan of Care  []  See progress note/recertification  []  See Discharge Summary         Progress towards goals / Updated goals:  Short Term Goals: To be accomplished in 1 weeks:              1. Pt will be I and compliant with HEP              IE- issued HEP              AVATLRG: reports compliance on 6-12-20    Long Term Goals: To be accomplished in 4 weeks:              1. Improve FOTO score to 59 for improved ability for daily tasks.               IE- 53              2.  The pt will report being able to perform moderate activities at home (like vacuuming) with no limitation              IE- limited a little.              3. The pt will demonstrate 4/5 glute max strength to diminish lumbar strain and improve ability for daily activities.             ZH- 3+/5                Current: remains 3+/5 on 6-22-20                       4.  The pt will report no difficulty with performing her usual housework              IE- limited a little   Current: limited a little on 6-24-20    PLAN  []  Upgrade activities as tolerated     [x]  Continue plan of care  []  Update interventions per flow sheet       []  Discharge due to:_  []  Other:_      Tangela Seo, PT 6/24/2020  9:53 AM    Future Appointments   Date Time Provider Joann Yates   6/29/2020  9:15 AM [facility]   7/1/2020  9:45 AM [facility]

## 2020-06-29 ENCOUNTER — HOSPITAL ENCOUNTER (OUTPATIENT)
Dept: PHYSICAL THERAPY | Age: 70
Discharge: HOME | End: 2020-06-29
Payer: MEDICARE

## 2020-06-29 PROCEDURE — 97110 THERAPEUTIC EXERCISES: CPT

## 2020-06-29 PROCEDURE — 97140 MANUAL THERAPY 1/> REGIONS: CPT

## 2020-06-29 PROCEDURE — 97112 NEUROMUSCULAR REEDUCATION: CPT

## 2020-06-29 NOTE — PROGRESS NOTES
PT DAILY TREATMENT NOTE 10-18    Patient Name: Monika Davis  Date:2020  : 1950  [x]  Patient  Verified  Payor: [place] MEDICARE / Plan: [place] MEDICARE PART A & B / Product Type: Medicare /    In time:905  Out WBXS:88  Total Treatment Time (min): 40  Visit #: 7 of 8    Medicare/BCBS Only   Total Timed Codes (min):  40 1:1 Treatment Time:  40       Treatment Area: Low back pain [M54.5]    SUBJECTIVE  Pain Level (0-10 scale): 2  Any medication changes, allergies to medications, adverse drug reactions, diagnosis change, or new procedure performed?: [x] No    [] Yes (see summary sheet for update)  Subjective functional status/changes:   [x] No changes reported       OBJECTIVE    Modality rationale:    Min Type Additional Details    [] Estim:  []Unatt       []IFC  []Premod                        []Other:  []w/ice   []w/heat  Position:  Location:    [] Estim: []Att    []TENS instruct  []NMES                    []Other:  []w/US   []w/ice   []w/heat  Position:  Location:    []  Traction: [] Cervical       []Lumbar                       [] Prone          []Supine                       []Intermittent   []Continuous Lbs:  [] before manual  [] after manual    []  Ultrasound: []Continuous   [] Pulsed                           []1MHz   []3MHz W/cm2:  Location:    []  Iontophoresis with dexamethasone         Location: [] Take home patch   [] In clinic    []  Ice     []  heat  []  Ice massage  []  Laser   []  Anodyne Position:  Location:    []  Laser with stim  []  Other:  Position:  Location:    []  Vasopneumatic Device Pressure:       [] lo [] med [] hi   Temperature: [] lo [] med [] hi   [] Skin assessment post-treatment:  []intact []redness- no adverse reaction    []redness  adverse reaction:     24 min Therapeutic Exercise:  [x]? ?? See flow sheet :   Rationale: increase ROM and increase strength to improve the patients ability to improve daily task performance with less back pain     8 min Neuromuscular Re-education:  []???  See flow sheet : standing trap bar and Core Align   Rationale: increase strength, improve coordination and increase proprioception  to improve the patients trunk endurance and core stability to reduce stress on lumbar spine      8 min Manual Therapy:  Lumbar STM B paraspinals, PAs lumbar spine   Rationale: increase ROM and increase tissue extensibility to improve ease of ADLs and self care performance            With   [] TE   [] TA   [] neuro   [] other: Patient Education: [x] Review HEP    [] Progressed/Changed HEP based on:   [] positioning   [] body mechanics   [] transfers   [] heat/ice application    [] other:      Other Objective/Functional Measures: increased reps per flow sheet     Pain Level (0-10 scale) post treatment: 2    ASSESSMENT/Changes in Function:  The pt demonstrates improved ability for daily activities but reports pain is variable. Patient will continue to benefit from skilled PT services to modify and progress therapeutic interventions, address functional mobility deficits, address ROM deficits, address strength deficits, analyze and address soft tissue restrictions, analyze and cue movement patterns and analyze and modify body mechanics/ergonomics to attain remaining goals. []  See Plan of Care  []  See progress note/recertification  []  See Discharge Summary         Progress towards goals / Updated goals:  Short Term Goals: To be accomplished in 1 weeks:              1. Pt will be I and compliant with HEP              ZS- issued HEP              DXDIXEK: reports compliance on 6-12-20     Long Term Goals: To be accomplished in 4 weeks:              1. Improve FOTO score to 59 for improved ability for daily tasks.               IE- 53              2. The pt will report being able to perform moderate activities at home (like vacuuming) with no limitation              IE- limited a little.              3.  The pt will demonstrate 4/5 glute max strength to diminish lumbar strain and improve ability for daily activities.             GY- 3+/5                HQBDSGP: remains 3+/5 on 6-22-20                       4.  The pt will report no difficulty with performing her usual housework              IE- limited a little              Current: limited a little on 6-24-20    PLAN  []  Upgrade activities as tolerated     [x]  Continue plan of care  []  Update interventions per flow sheet       []  Discharge due to:_  []  Other:_      Brandy Calvert, PT 6/29/2020  9:20 AM    Future Appointments   Date Time Provider Joann Yates   7/1/2020  9:45 AM Ana Olivares, PT [id number]

## 2020-07-01 ENCOUNTER — HOSPITAL ENCOUNTER (OUTPATIENT)
Dept: PHYSICAL THERAPY | Age: 70
Discharge: HOME | End: 2020-07-01
Payer: MEDICARE

## 2020-07-01 PROCEDURE — 97140 MANUAL THERAPY 1/> REGIONS: CPT

## 2020-07-01 PROCEDURE — 97112 NEUROMUSCULAR REEDUCATION: CPT

## 2020-07-01 PROCEDURE — 97110 THERAPEUTIC EXERCISES: CPT

## 2020-07-01 NOTE — PROGRESS NOTES
[facility] PT DISCHARGE DAILY NOTE AND [id number]    Date:2020  Patient name: Ivan London Start of Care: 2020   Referral source: Phil Gore MD : 1950               Medical Diagnosis: Spinal stenosis, lumbar region with neurogenic claudication [M48.062]  Myalgia, other site [M79.18]  Low back pain [M54.5]  Payor: [place] MEDICARE / Plan: [place] MEDICARE PART A & B / Product Type: Medicare /  Onset Date:2020               Treatment Diagnosis: LBP   Prior Hospitalization: see medical history Provider#: [id number]   Medications: Verified on Patient summary List    Comorbidities: OA, depression   Prior Level of Function: functionally I with daily activities. Visits from Start of Care: 8    Missed Visits: 0    Reporting Period : 20 to 20    [x]  Patient  Verified  Payor: VA MEDICARE / Plan: [place] MEDICARE PART A & B / Product Type: Medicare /    In [id number]  Out time:1030  Total Treatment Time (min): 45  Visit #: 8 of 8    Medicare/BCBS Only   Total Timed Codes (min):  45 1:1 Treatment Time:  45       SUBJECTIVE  Pain Level (0-10 scale): 2  Any medication changes, allergies to medications, adverse drug reactions, diagnosis change, or new procedure performed?: [x] No    [] Yes (see summary sheet for update)  Subjective functional status/changes:   [] No changes reported  The pt reports about 70% improvement with PT but does still report pain in L/S and into B LEs. OBJECTIVE    29 min Therapeutic Exercise:  [x]? ??? See flow sheet :   Rationale: increase ROM and increase strength to improve the patients ability to improve daily task performance with less back pain     8 min Neuromuscular Re-education:  []????  See flow sheet : standing trap bar and Core Align   Rationale: increase strength, improve coordination and increase proprioception  to improve the patients trunk endurance and core stability to reduce stress on lumbar spine      8 min Manual Therapy:  Lumbar STM B paraspinals, PAs lumbar spine   Rationale: increase ROM and increase tissue extensibility to improve ease of ADLs and self care performance            With   [] TE   [] TA   [] neuro   [] other: Patient Education: [x] Review HEP    [] Progressed/Changed HEP based on:   [] positioning   [] body mechanics   [] transfers   [] heat/ice application    [] other:      Other Objective/Functional Measures:  Glute Max MMT 4-/5     Pain Level (0-10 scale) post treatment: 1    Summary of Care: The pt reports 70% improvement with PT but is still having pain in the lumbar region and into B LEs. She reports most difficulty with standing and sitting for prolonged periods of time. Her FOTO score declined despite reporting 70% improvement. Progress towards goals:    Short Term Goals:               1. Pt will be I and compliant with HEP              IE- issued HEP              LGOJVLD: reports compliance on 6-12-20     Long Term Goals:               1. Improve FOTO score to 59 for improved ability for daily tasks.               IE- 53   Current: Not met 50              2. The pt will report being able to perform moderate activities at home (like vacuuming) with no limitation              IE- limited a little. Current: not met              3. The pt will demonstrate 4/5 glute max strength to diminish lumbar strain and improve ability for daily activities.             PK- 3+/5                [id number]: progressed to 4-/5                      4. The pt will report no difficulty with performing her usual housework              PT- limited a little              Current: Not met     ASSESSMENT/Changes in Function:  Discharge PT at this time.      Thank you for this referral!     PLAN  [x]Discontinue therapy: []Patient has reached or is progressing toward set goals      []Patient is non-compliant or has abdicated      [x]Due to lack of appreciable progress towards set [address], PT 7/1/2020  9:56 AM

## 2020-07-23 ENCOUNTER — VIRTUAL VISIT (OUTPATIENT)
Dept: ORTHOPEDIC SURGERY | Age: 70
End: 2020-07-23

## 2020-07-23 DIAGNOSIS — M48.062 SPINAL STENOSIS OF LUMBAR REGION WITH NEUROGENIC CLAUDICATION: Primary | ICD-10-CM

## 2020-07-23 DIAGNOSIS — M79.18 MYOFASCIAL PAIN: ICD-10-CM

## 2020-07-23 DIAGNOSIS — M54.59 MECHANICAL LOW BACK PAIN: ICD-10-CM

## 2020-07-23 DIAGNOSIS — M79.18 CHRONIC PRIMARY MUSCULOSKELETAL PAIN: ICD-10-CM

## 2020-07-23 DIAGNOSIS — M47.816 LUMBAR FACET ARTHROPATHY: ICD-10-CM

## 2020-07-23 DIAGNOSIS — G89.29 CHRONIC PRIMARY MUSCULOSKELETAL PAIN: ICD-10-CM

## 2020-07-23 NOTE — PROGRESS NOTES
[address], [address]  [place], [address] [place] [address]  Phone: [phone]  Fax: [phone]        Elisabet Bejarano  : 1950  PCP: Humberto Rnadall MD  2020    PROGRESS NOTE    Consent: Dana Boone is a 68 y.o. female who was seen by synchronous (real-time) audio-video technology, and/or her healthcare decision maker, is aware that this patient-initiated, Telehealth encounter on 2020 is a billable service, with coverage as determined by his/her insurance carrier. He/She is aware that he/she may receive a bill and has provided verbal consent to proceed: Yes. The visit was conducted in the office with the patient being in home through a D[employer] platform. Visit video time start: 12:07 PM end: 12:14 PM      HISTORY OF PRESENT ILLNESS  Dana Boone is a 68 y.o. female who was seen as a new patient 2020 with c/o chronic low back pain and leg pain. Her issues were progressive. She appeared to have 2 separate issues. One is that she had back and leg pain (R>L) after standing for a period of time. 5-10 minutes was enough to encourage her to sit down. Afterwards, her symptoms appeared to resolve. 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 This was more of an axial pain that caused an episodic sharp pain in her back she felt was consistent with muscle spasm. Her pain at rest was a 4/10. She had no associated weakness or sensory deficit. She had been prescribed muscle relaxers, but had not taken them. She was using tylenol with some benefit. She had some x-rays of the lumbar spine done over a year ago. It reportedly said that there was facet arthropathy and disc degeneration. She increased her Gabapentin at night to 600 mg but continues to take 300 mg during the morning and day. She noticed some improvement, but continued to have pain. Dana Boone is seen virtually for f/u.  She attended PT (6/8/2020-7/1/2020; [facility]) with benefit. Pt notes that her symptoms are unchanged in location, but at a lower pain level. She has discontinued Gabapentin because she did not want to become reliant on the medications. She occasionally takes Tylenol. Pain Scale: 3/10    Treatments patient has tried:  Physical therapy: Yes - 2020  Doing HEP: Yes  Non-opioid medications: Yes  Spinal injections: No  Spinal surgery- No.   Last Lumbar MRI - None      reviewed. ASSESSMENT  This is a 68year-old female with lumbar pain radiating into the BLE (R>L). Her low back and leg pain may be multifactorial. 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 Her leg symptoms and flexed forward posture are likely related to spinal stenosis due to her claudication symptoms. Her arthropathy is likely asymptomatic at this time. PLAN  1. Lumbar MRI - persistent low back pain radiating into BLE, despite >6 weeks conservative tx; evaluate stenosis    2. Continue HEP. Pt will f/u after MRI or sooner as needed. Diagnoses and all orders for this visit:    1. Spinal stenosis of lumbar region with neurogenic claudication  -     MRI LUMB SPINE WO CONT; Future    2. Chronic primary musculoskeletal pain  -     MRI LUMB SPINE WO CONT; Future    3. Myofascial pain  -     MRI LUMB SPINE WO CONT; Future    4. Mechanical low back pain  -     MRI LUMB SPINE WO CONT; Future    5. Lumbar facet arthropathy  -     MRI LUMB SPINE WO CONT; Future         PAST MEDICAL HISTORY   Past Medical History:   Diagnosis Date    Dysplasia of one kidney     Hyperlipidemia        Past Surgical History:   Procedure Laterality Date    HX HYSTERECTOMY      HX OOPHORECTOMY     .       MEDICATIONS      Current Outpatient Medications   Medication Sig Dispense Refill    colesevelam (WELCHOL) 625 mg tablet       escitalopram oxalate (LEXAPRO) 20 mg tablet       rosuvastatin (CRESTOR) 5 mg tablet  trimethoprim (TRIMPEX) 100 mg tablet       gabapentin (NEURONTIN) 300 mg capsule Take 2 Caps by mouth three (3) times daily. Max Daily Amount: 1,800 mg. 180 Cap 2    baclofen (LIORESAL) 10 mg tablet TAKE 1 TABLET BY MOUTH THREE TIMES DAILY AS NEEDED          ALLERGIES  No Known Allergies       SOCIAL HISTORY    Social History     Socioeconomic History    Marital status:      Spouse name: Not on file    Number of children: Not on file    Years of education: Not on file    Highest education level: Not on file   Tobacco Use    Smoking status: Never Smoker    Smokeless tobacco: Never Used   Substance and Sexual Activity    Alcohol use: Never     Frequency: Never    Drug use: Never   Social History Narrative    ** Merged History Encounter **            FAMILY HISTORY    Family History   Problem Relation Age of Onset    Hypertension Mother     Hypertension Father     Hypertension Sister     Diabetes Sister     Hypertension Brother     Diabetes Brother          REVIEW OF SYSTEMS  Review of Systems   Musculoskeletal: Positive for back pain. BLE paraesthesia (R>L)        PHYSICAL EXAMINATION    Pain Assessment  7/23/2020   Location of Pain Back   Severity of Pain 3   Quality of Pain Sharp; Other (Comment)   Quality of Pain Comment spasm   Duration of Pain Persistent   Frequency of Pain Constant   Aggravating Factors Bending;Standing   Limiting Behavior Yes   Relieving Factors NSAID   Result of Injury No           -Constitutional: Healthy appearing, well developed, alert, in no acute distress  - Eyes: Conjunctiva clear, lids unremarkable, sclera anicteric  - Ears, nose, mouth, and throat: External inspection head, face, ears and nose identifies normal appearance without obvious deformity.  -Neck: No asymmetry, no masses, trachea is midline, and normal overall appearance.  -Respiratory: Respirations are even and unlabored.    -Musculoskeletal: No cyanosis, clubbing, or edema observed  -Musculoskeletal: Inspection of the following identifies no gross deformity, misalignment, or asymmetry:   -Head/neck   -Spine   -Ribs/pelvis   -Right upper extremity    -Left upper extremity      -Musculoskeletal: Assessment of range of motion of the following identifies no gross limitations:    -Head/neck   -Spine   -Ribs/pelvis   -Right upper extremity    -Left upper extremity    -Skin: Head and neck skin with no lesions or rash  -Neurologic: Cranial nerves 3-12 grossly intact. -Psychiatric: Judgement and insight intact. The limitations of a telemedicine visit including the inability to perform a detailed physical examination may miss significant findings was presented to the patient, and the patient still elected to proceed with the telemedicine visit. RADIOGRAPHS  Lumbar XR images taken on 1/18/19 personally reviewed with patient:  3 views of the lumbar spine obtained. Lumbar dextrocurvature apex L2,  slightly limits exam. Vertebral body heights appear preserved. Multilevel mild  disc space loss and chronic appearing endplate changes. Mild retrolisthesis of  L2 on L3 and L4 and L5, difficult to measure due to obliquity of lateral view. Lumbar lordosis maintained. Lower lumbar facet arthropathy. Multilevel mid to  lower lumbar spinous process abutment.     IMPRESSION  IMPRESSION:     No clearly acute findings. Degenerative changes as above. Mild levocurvature  apex L2. Mild retrolisthesis of L2 on L3 and L4 on L5.  reviewed    Ms. Steve Fuentes has a reminder for a \"due or due soon\" health maintenance. I have asked that she contact her primary care provider for follow-up on this health maintenance.      Pursuant to the emergency declaration under the [address] [place] [address], [phone] waiver authority and the [employer] Act, this Virtual  Visit was conducted, with patient's consent, to reduce the patient's risk of exposure to COVID-19 and provide continuity of care for an established patient. Services were provided through a video synchronous discussion virtually to substitute for in-person clinic visit. CPT Codes 54794-98771 for Established Patients may apply to this Telehealth Visit Time-based coding, delete if not needed: I spent at least 5 minutes with this established patient, and >50% of the time was spent counseling and/or coordinating care. Written by Elisa Lyons, as dictated by Dr. Noris Cohen. I, Dr. Noris Cohen, confirm that all documentation is accurate.

## 2020-07-27 DIAGNOSIS — M47.816 LUMBAR FACET ARTHROPATHY: ICD-10-CM

## 2020-07-27 DIAGNOSIS — M79.18 CHRONIC PRIMARY MUSCULOSKELETAL PAIN: ICD-10-CM

## 2020-07-27 DIAGNOSIS — M79.18 MYOFASCIAL PAIN: ICD-10-CM

## 2020-07-27 DIAGNOSIS — G89.29 CHRONIC PRIMARY MUSCULOSKELETAL PAIN: ICD-10-CM

## 2020-07-27 DIAGNOSIS — M48.062 SPINAL STENOSIS OF LUMBAR REGION WITH NEUROGENIC CLAUDICATION: Primary | ICD-10-CM

## 2020-07-27 DIAGNOSIS — M54.59 MECHANICAL LOW BACK PAIN: ICD-10-CM

## 2020-07-28 ENCOUNTER — HOSPITAL ENCOUNTER (OUTPATIENT)
Dept: GENERAL RADIOLOGY | Age: 70
Discharge: HOME | End: 2020-07-28
Payer: MEDICARE

## 2020-07-28 DIAGNOSIS — G89.29 CHRONIC PRIMARY MUSCULOSKELETAL PAIN: ICD-10-CM

## 2020-07-28 DIAGNOSIS — M54.59 MECHANICAL LOW BACK PAIN: ICD-10-CM

## 2020-07-28 DIAGNOSIS — M79.18 CHRONIC PRIMARY MUSCULOSKELETAL PAIN: ICD-10-CM

## 2020-07-28 DIAGNOSIS — M79.18 MYOFASCIAL PAIN: ICD-10-CM

## 2020-07-28 DIAGNOSIS — M48.062 SPINAL STENOSIS OF LUMBAR REGION WITH NEUROGENIC CLAUDICATION: ICD-10-CM

## 2020-07-28 DIAGNOSIS — M47.816 LUMBAR FACET ARTHROPATHY: ICD-10-CM

## 2020-07-28 PROCEDURE — 72100 X-RAY EXAM L-S SPINE 2/3 VWS: CPT

## 2020-08-06 ENCOUNTER — HOSPITAL ENCOUNTER (OUTPATIENT)
Dept: GENERAL RADIOLOGY | Age: 70
Discharge: HOME | End: 2020-08-06
Payer: MEDICARE

## 2020-08-06 ENCOUNTER — TELEPHONE (OUTPATIENT)
Dept: ORTHOPEDIC SURGERY | Age: 70
End: 2020-08-06
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
## 2020-08-06 PROCEDURE — 73030 X-RAY EXAM OF SHOULDER: CPT

## 2020-08-06 PROCEDURE — 72040 X-RAY EXAM NECK SPINE 2-3 VW: CPT

## 2020-08-22 ENCOUNTER — HOSPITAL ENCOUNTER (OUTPATIENT)
Age: 70
Discharge: HOME | End: 2020-08-22
Attending: PHYSICAL MEDICINE & REHABILITATION
Payer: MEDICARE

## 2020-08-22 DIAGNOSIS — G89.29 CHRONIC PRIMARY MUSCULOSKELETAL PAIN: ICD-10-CM

## 2020-08-22 DIAGNOSIS — M54.59 MECHANICAL LOW BACK PAIN: ICD-10-CM

## 2020-08-22 DIAGNOSIS — M48.062 SPINAL STENOSIS OF LUMBAR REGION WITH NEUROGENIC CLAUDICATION: ICD-10-CM

## 2020-08-22 DIAGNOSIS — M47.816 LUMBAR FACET ARTHROPATHY: ICD-10-CM

## 2020-08-22 DIAGNOSIS — M79.18 CHRONIC PRIMARY MUSCULOSKELETAL PAIN: ICD-10-CM

## 2020-08-22 DIAGNOSIS — M79.18 MYOFASCIAL PAIN: ICD-10-CM

## 2020-08-22 PROCEDURE — 72148 MRI LUMBAR SPINE W/O DYE: CPT

## 2020-09-04 ENCOUNTER — HOSPITAL ENCOUNTER (OUTPATIENT)
Dept: BONE DENSITY | Age: 70
Discharge: HOME | End: 2020-09-04
Attending: FAMILY MEDICINE
Payer: MEDICARE

## 2020-09-04 DIAGNOSIS — M85.851 OTHER SPECIFIED DISORDERS OF BONE DENSITY AND STRUCTURE, RIGHT THIGH: ICD-10-CM

## 2020-09-04 PROCEDURE — 77080 DXA BONE DENSITY AXIAL: CPT

## 2020-09-15 ENCOUNTER — OFFICE VISIT (OUTPATIENT)
Dept: ORTHOPEDIC SURGERY | Age: 70
End: 2020-09-15

## 2020-09-15 VITALS
TEMPERATURE: 98 F | SYSTOLIC BLOOD PRESSURE: 112 MMHG | HEART RATE: 73 BPM | DIASTOLIC BLOOD PRESSURE: 64 MMHG | RESPIRATION RATE: 18 BRPM | OXYGEN SATURATION: 97 %

## 2020-09-15 DIAGNOSIS — M79.18 MYOFASCIAL PAIN: ICD-10-CM

## 2020-09-15 DIAGNOSIS — M54.59 MECHANICAL LOW BACK PAIN: ICD-10-CM

## 2020-09-15 DIAGNOSIS — M54.16 LUMBAR RADICULOPATHY: Primary | ICD-10-CM

## 2020-09-15 RX ORDER — TORSEMIDE 10 MG/1
10 TABLET ORAL AS NEEDED
COMMUNITY
Start: 2020-07-02

## 2020-09-15 RX ORDER — LEVMETAMFETAMINE 50 MG
INHALER (EA) NASAL
COMMUNITY

## 2020-09-15 RX ORDER — LEVOCETIRIZINE DIHYDROCHLORIDE 5 MG/1
5 TABLET, FILM COATED ORAL
COMMUNITY

## 2020-09-15 RX ORDER — SULINDAC 200 MG/1
TABLET ORAL 2 TIMES DAILY
COMMUNITY
End: 2021-05-20

## 2020-09-15 NOTE — PROGRESS NOTES
[address], [address]  [place], [facility]  Phone: [phone]  Fax: [phone]        Jcarlos Moyeruer  : 1950  PCP: Nomi Lomas MD  9/15/2020    PROGRESS NOTE      HISTORY OF PRESENT ILLNESS  Wendy Donaldson is a 65 y.o. female who was seen as a new patient 2020 with c/o chronic low back pain and leg pain. Her issues were progressive. She appeared to have 2 separate issues. One was that she had back and leg pain (R>L) after standing for a period of time. 5-10 minutes was enough to encourage her to sit down. Afterwards, her symptoms appeared to resolve. 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 This was more of an axial pain that caused an episodic sharp pain in her back she felt was consistent with muscle spasm. Her pain at rest was a 4/10. She had no associated weakness or sensory deficit. She had been prescribed muscle relaxers, but had not taken them. She was using tylenol with some benefit. She had some x-rays of the lumbar spine done over a year ago. It reportedly said that there was facet arthropathy and disc degeneration. She increased her Gabapentin at night to 600 mg but continued to take 300 mg during the morning and day. She noticed some improvement, but continued to have pain. She attended PT (2020-2020; [facility]) with benefit. Pt noted that her symptoms were unchanged in location, but at a lower pain level. She discontinued Gabapentin because she did not want to become reliant on the medications. She occasionally took Tylenol. Wendy Donaldson comes in to the office today for f/u. She continues to have severe low back pain. Lumbar spine MRI dated 2020 reviewed. Per report, Degenerative malalignment. Thigh level lumbar spondylosis. Multifactorial acquired moderate central canal stenosis at L4/L5.  Bilateral moderate to severe foraminal stenosis, also noted at L4/L5. Sacral perineural cysts are identified, benign appearance. Paracentral canal contents, and paravertebral soft tissues unremarkable. Pain Score: 5/10. Treatments patient has tried:  Physical therapy: Yes - 2020  Doing HEP: Yes  Non-opioid medications: Yes  Spinal injections: No  Spinal surgery- No.   Last Lumbar MRI - 2020      reviewed.      ASSESSMENT  This is a 68year-old female with lumbar pain radiating into the RLE. Her low back and leg pain may be multifactorial. 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 Her RLE paraesthesia is likely due to a right L4 radiculopathy due to severe foraminal stenosis. PLAN  1. Right L4 TFESI. 2. We may consider increasing her Gabapentin to 600 mg TID if she does not find benefit from the injection. Pt will f/u in 2-4 weeks after injection or sooner as needed. Diagnoses and all orders for this visit:    1. Lumbar radiculopathy  -     SCHEDULE SURGERY    2. Mechanical low back pain    3. Myofascial pain         PAST MEDICAL HISTORY   Past Medical History:   Diagnosis Date    Dysplasia of one kidney     Hyperlipidemia        Past Surgical History:   Procedure Laterality Date    HX HYSTERECTOMY      HX OOPHORECTOMY     . MEDICATIONS    Current Outpatient Medications   Medication Sig Dispense Refill    gabapentin (NEURONTIN) 300 mg capsule Take 2 Caps by mouth three (3) times daily.  Max Daily Amount: 1,800 mg. 180 Cap 2    baclofen (LIORESAL) 10 mg tablet TAKE 1 TABLET BY MOUTH THREE TIMES DAILY AS NEEDED      colesevelam (WELCHOL) 625 mg tablet       escitalopram oxalate (LEXAPRO) 20 mg tablet       rosuvastatin (CRESTOR) 5 mg tablet       trimethoprim (TRIMPEX) 100 mg tablet           ALLERGIES  No Known Allergies       SOCIAL HISTORY    Social History     Socioeconomic History    Marital status:      Spouse name: Not on file    Number of children: Not on file    Years of education: Not on file    Highest education level: Not on file   Tobacco Use    Smoking status: Never Smoker    Smokeless tobacco: Never Used   Substance and Sexual Activity    Alcohol use: Never     Frequency: Never    Drug use: Never   Social History Narrative    ** Merged History Encounter **            FAMILY HISTORY  Family History   Problem Relation Age of Onset    Hypertension Mother     Hypertension Father     Hypertension Sister     Diabetes Sister     Hypertension Brother     Diabetes Brother          REVIEW OF SYSTEMS  Review of Systems   Musculoskeletal: Positive for back pain. RLE paraesthesia          PHYSICAL EXAMINATION  Visit Vitals  /64   Pulse 73   Temp 98 °F (36.7 °C) (Temporal)   Resp 18   SpO2 97%       Pain Assessment  9/15/2020   Location of Pain Back   Location Modifiers Right   Severity of Pain 5   Quality of Pain Sharp   Quality of Pain Comment -   Duration of Pain Persistent   Frequency of Pain Constant   Aggravating Factors -   Limiting Behavior -   Relieving Factors -   Result of Injury -           Constitutional:  Well developed, well nourished, in no acute distress. Psychiatric: Affect and mood are appropriate. Integumentary: No rashes or abrasions noted on exposed areas. SPINE/MUSCULOSKELETAL EXAM      MOTOR:      Biceps  Triceps Deltoids Wrist Ext Wrist Flex Hand Intrin   Right 5/5 5/5 5/5 5/5 5/5 5/5   Left 5/5 5/5 5/5 5/5 5/5 5/5             Hip Flex  Quads Hamstrings Ankle DF EHL Ankle PF   Right 5/5 5/5 5/5 5/5 5/5 5/5   Left 5/5 5/5 5/5 5/5 5/5 5/5     RADIOGRAPHS  Lumbar MRI images taken on 8/22/2020 personally reviewed with patient:  Localizing scans show a rotatory scoliosis,  lumbar curvature convex to the  left. Sagittal imaging with mild grade 1 degenerative retrolisthesis, L2 on L3. Alignment is otherwise in the sagittal plane preserved. Vertebral body heights  preserved. Trilevel degenerative disc disease.  Bone marrow signal nonpathologic. Contents of sac look normal.Paravertebral soft tissues unremarkable.     T12/L1: Normal     L1/L2: Mild diffuse bulging disc annulus. No significant central canal or  foraminal compromise.     L2/L3: Moderate to advanced generative disc disease with grade 1 degenerative retrolisthesis. Diffuse ventral and dorsal disc bulging. Moderate ventral impression noted on thecal sac from disc bulge component. Left greater than right lateral disc bulge extension, with associated left greater than right moderate soft tissue foraminal compromise.     L3/L4: Mild generative disc disease. Minimal disc bulging. Left-sided foramen  with mild soft tissue foraminal compromise. Right-sided exit foramen patent.     L4/L5: Diffuse bulging disc annulus with flattening ventral aspect of thecal  sac. There is a component of acquired central canal stenosis at this level with  additional components of central canal constriction from facet and ligamentum  flavum hypertrophy. Lateral disc bulge extension, with right greater than left  moderate to severe foraminal stenosis.     L5/S1: Minimal disc degeneration. Central canal patent. Exit foramina patent.     Several sacral perineural cysts are identified as well, benign appearance.     IMPRESSION  IMPRESSION:     Degenerative malalignment.     Thigh level lumbar spondylosis.     Multifactorial acquired moderate central canal stenosis at L4/L5.     Bilateral moderate to severe foraminal stenosis, also noted at L4/L5.     Sacral perineural cysts are identified, benign appearance.     Paracentral canal contents, and paravertebral soft tissues unremarkable. Lumbar XR images taken on 1/18/19 personally reviewed with patient:  3 views of the lumbar spine obtained. Lumbar dextrocurvature apex L2,  slightly limits exam. Vertebral body heights appear preserved. Multilevel mild  disc space loss and chronic appearing endplate changes.  Mild retrolisthesis of  L2 on L3 and L4 and L5, difficult to measure due to obliquity of lateral view. Lumbar lordosis maintained. Lower lumbar facet arthropathy. Multilevel mid to  lower lumbar spinous process abutment.     IMPRESSION  IMPRESSION:     No clearly acute findings. Degenerative changes as above. Mild levocurvature  apex L2. Mild retrolisthesis of L2 on L3 and L4 on L5.    15 minutes of face-to-face contact were spent with the patient during today's visit extensively discussing symptoms and treatment plan. All questions were answered. More than half of this visit today was spent on counseling.      Written by Jose M Garcia as dictated by Ananda Villar MD

## 2020-09-18 ENCOUNTER — TELEPHONE (OUTPATIENT)
Dept: ORTHOPEDIC SURGERY | Age: 70
End: 2020-09-18

## 2020-09-18 NOTE — TELEPHONE ENCOUNTER
Pt states no one has contacted her to schedule her spinal injection and has left several voice mails    Please contact the pt at F#[phone]

## 2020-09-21 NOTE — TELEPHONE ENCOUNTER
Pt states she's left 3 voice mail messages for the spinal injection  with no response. Pt states she's going to come to the office if she isn't contacted today.     Pt L#[phone]

## 2020-09-21 NOTE — TELEPHONE ENCOUNTER
I called and spoke to MsAnna Chris. The pt was identified using 2 pt identifiers. She was informed that the injection  has been out of the office for the last 3 work days. She is expected back in the office tomorrow and the pt's name will be brought to her for scheduling. The pt verbalized understanding and has no other questions at this time.

## 2020-09-22 NOTE — TELEPHONE ENCOUNTER
Patient called back still frustrated she has not been able to schedule the appt. She states the recording for  states she won't be back until next week. She is angry and in pain.       Please advise [phone]

## 2020-09-22 NOTE — TELEPHONE ENCOUNTER
I called the patient and got her scheduled for her injection. She is going to come by the office and sign her paperwork and get instructions for injection on Thursday. I explained that Cortes Liv was out of office for personal reasons.

## 2020-09-22 NOTE — TELEPHONE ENCOUNTER
I spoke to Shirleyann Galeazzi. She has gotten in touch with the pt and put her on the schedule. She will be coming by the office to sign the paperwork.

## 2020-10-01 ENCOUNTER — APPOINTMENT (OUTPATIENT)
Dept: GENERAL RADIOLOGY | Age: 70
End: 2020-10-01
Attending: PHYSICAL MEDICINE & REHABILITATION
Payer: MEDICARE

## 2020-10-01 ENCOUNTER — HOSPITAL ENCOUNTER (OUTPATIENT)
Age: 70
Setting detail: OUTPATIENT SURGERY
Discharge: HOME | End: 2020-10-01
Attending: PHYSICAL MEDICINE & REHABILITATION | Admitting: PHYSICAL MEDICINE & REHABILITATION
Payer: MEDICARE

## 2020-10-01 VITALS
TEMPERATURE: 98.4 F | OXYGEN SATURATION: 94 % | RESPIRATION RATE: 20 BRPM | SYSTOLIC BLOOD PRESSURE: 123 MMHG | DIASTOLIC BLOOD PRESSURE: 72 MMHG | HEART RATE: 85 BPM

## 2020-10-01 PROCEDURE — 77030003676 HC NDL SPN MPRI -A: Performed by: PHYSICAL MEDICINE & REHABILITATION

## 2020-10-01 PROCEDURE — 74011000250 HC RX REV CODE- 250: Performed by: PHYSICAL MEDICINE & REHABILITATION

## 2020-10-01 PROCEDURE — 74011250637 HC RX REV CODE- 250/637: Performed by: PHYSICAL MEDICINE & REHABILITATION

## 2020-10-01 PROCEDURE — 77030003669 HC NDL SPN COOK -B: Performed by: PHYSICAL MEDICINE & REHABILITATION

## 2020-10-01 PROCEDURE — 74011000636 HC RX REV CODE- 636: Performed by: PHYSICAL MEDICINE & REHABILITATION

## 2020-10-01 PROCEDURE — 2709999900 HC NON-CHARGEABLE SUPPLY: Performed by: PHYSICAL MEDICINE & REHABILITATION

## 2020-10-01 PROCEDURE — 77030039433 HC TY MYLEOGRAM BD -B: Performed by: PHYSICAL MEDICINE & REHABILITATION

## 2020-10-01 PROCEDURE — 76010000009 HC PAIN MGT 0 TO 30 MIN PROC: Performed by: PHYSICAL MEDICINE & REHABILITATION

## 2020-10-01 PROCEDURE — 74011250636 HC RX REV CODE- 250/636: Performed by: PHYSICAL MEDICINE & REHABILITATION

## 2020-10-01 RX ORDER — DIAZEPAM 5 MG/1
5-20 TABLET ORAL ONCE
Status: COMPLETED | OUTPATIENT
Start: 2020-10-01 | End: 2020-10-01

## 2020-10-01 RX ORDER — DEXAMETHASONE SODIUM PHOSPHATE 100 MG/10ML
INJECTION INTRAMUSCULAR; INTRAVENOUS AS NEEDED
Status: DISCONTINUED | OUTPATIENT
Start: 2020-10-01 | End: 2020-10-01 | Stop reason: HOSPADM

## 2020-10-01 RX ORDER — LIDOCAINE HYDROCHLORIDE 10 MG/ML
INJECTION, SOLUTION EPIDURAL; INFILTRATION; INTRACAUDAL; PERINEURAL AS NEEDED
Status: DISCONTINUED | OUTPATIENT
Start: 2020-10-01 | End: 2020-10-01 | Stop reason: HOSPADM

## 2020-10-01 RX ADMIN — DIAZEPAM 5 MG: 5 TABLET ORAL at 13:08

## 2020-10-01 NOTE — DISCHARGE INSTRUCTIONS
[facility]   ([facility])  Dr. Zaynab Francis, Dr. Marlys Castillo, Dr. Jc Turner Spinal Procedure (Block) Instructions    * Do not drive a car, operate heavy machinery or dangerous equipment, or make important decisions for 12-24 hours. * Light activity as tolerated; may rest for the remainder of the day. * Resume pre-block medications including those from your other doctors. * Do not drink alcoholic beverages for 24 hours. Alcohol and the medications you have received may interact and cause an adverse reaction. * You may feel better this evening and worse tomorrow, as the numbing medications wears off and the steroid has yet to begin to work. After 48-72 hrs the steroid should begin to release bringing you relief. If you had a medial branch block, no steroids were used. The medial branch block is a test to see if you are a candidate for radiofrequency ablation (RFA). The anesthetic (numbing medicine)  will wear off by the next day. * You may shower this evening and remove any bandages. * Avoid hot tubs/pools/tub soaks and heating pads for 24 hours. You may use cold packs on the procedure site as tolerated for the first 24 hours. * If a headache develops, drink plenty of fluids and rest.  Take over the counter medications for headache if needed. If the headache continues longer than 24 hours, call MD at the [address] [place] [address] Avenue. [phone]    * Continue taking pain medications as needed. * You may resume your regular diet if tolerated. Otherwise, start with sips of water and advance slowly. * If Diabetic: check your blood sugar three times a day for the next 3 days. If your sugar is greater than 300 call your family doctor. If your sugar is greater than 400, have someone transport you to the nearest Emergency Room. * If you experience any of the following problems, Please Call the [address] [place] [address] Avenue at [phone].         * Excessive pain, swelling, redness or odor at or around the surgical area    * Fever of 101 or higher    * Nausea / Vomiting lasting longer than 4 hours or if unable to take medications. * Severe Headache    * Weakness or numbness in arms or legs that is not      resolving   * Any NEW signs of decreased circulation or nerve impairment in leg: change in color, swelling, persistent numbness, tingling                    * Prolonged increase in pain greater than 4 days      PATIENT INSTRUCTIONS:    After oral sedation, for 12-24 hours or while taking prescription Narcotics:  · Limit your activities  · Do not drive and operate hazardous machinery  · Do not make important personal or business decisions  · Do  not drink alcoholic beverages  · If you have not urinated within 8 hours after discharge, please contact your surgeon on call. *  Please give a list of your current medications to your Primary Care Provider. *  Please update this list whenever your medications are discontinued, doses are      changed, or new medications (including over-the-counter products) are added. *  Please carry medication information at all times in case of emergency situations. These are general instructions for a healthy lifestyle:    No smoking/ No tobacco products/ Avoid exposure to second hand smoke    Surgeon General's Warning:  Quitting smoking now greatly reduces serious risk to your health. Obesity, smoking, and sedentary lifestyle greatly increases your risk for illness    A healthy diet, regular physical exercise & weight monitoring are important for maintaining a healthy lifestyle    You may be retaining fluid if you have a history of heart failure or if you experience any of the following symptoms:  Weight gain of 3 pounds or more overnight or 5 pounds in a week, increased swelling in our hands or feet or shortness of breath while lying flat in bed.   Please call your doctor as soon as you notice any of these symptoms; do not wait until your next office visit. Recognize signs and symptoms of STROKE:    F-face looks uneven    A-arms unable to move or move unevenly    S-speech slurred or non-existent    T-time-call 911 as soon as signs and symptoms begin-DO NOT go       Back to bed or wait to see if you get better-TIME IS BRAIN.

## 2020-10-01 NOTE — PROCEDURES
PROCEDURE NOTE      Patient Name: Ariela Forte    Date of Procedure: October 1, 2020    Preoperative Diagnosis:  Lumbar radiculopathy [M54.16]    Post Operative Diagnosis:  Lumbar radiculopathy [M54.16]    Procedure :    right L4 Selective Nerve Root Block      Consent:  Informed consent was obtained prior to the procedure. The patient was given the opportunity to ask questions regarding the procedure and its associated risks. In addition to the potential risks associated with the procedure itself, the patient was informed both verbally and in writing of the potential side effects of the use of glucocorticoid. The patient appeared to comprehend the informed consent and desired to have the procedure performed. Procedure: The patient was placed in the prone position on the fluoroscopy table and the back was prepped and draped in the usual sterile manner. The exact spinal level was  identified using fluoroscopy, and Lidocaine 1 % was injected locally, a # 22 gauge spinal needle was passed to the transverse process. The depth was noted and the needle redirected to pass inferior and approximately one cm anterior to the transverse process. YES  1 cc of Isovue M-200 was used to verify positioning in the epidural and paravertebral space and outlined the course of the spinal nerve into the epidural space. The same procedure was repeated at each spinal level indicated above. A total of 10 mg of preservative free Dexamethasone and 1 cc of Lidocaine was slowly injected. The patient tolerated the procedure well. The injection area was cleaned and bandaids applied. Not excessive bleeding was noted. Patient dressed and discharged to home with instructions. Discussion: The patient tolerated the procedure well.                                               Dirk Rojo MD  October 1, 2020

## 2020-11-02 ENCOUNTER — HOSPITAL ENCOUNTER (OUTPATIENT)
Dept: MAMMOGRAPHY | Age: 70
Discharge: HOME | End: 2020-11-02
Attending: FAMILY MEDICINE
Payer: MEDICARE

## 2020-11-02 DIAGNOSIS — Z12.31 VISIT FOR SCREENING MAMMOGRAM: ICD-10-CM

## 2020-11-02 PROCEDURE — 77063 BREAST TOMOSYNTHESIS BI: CPT

## 2020-11-12 ENCOUNTER — VIRTUAL VISIT (OUTPATIENT)
Dept: ORTHOPEDIC SURGERY | Age: 70
End: 2020-11-12
Payer: MEDICARE

## 2020-11-12 DIAGNOSIS — M79.18 MYOFASCIAL PAIN: ICD-10-CM

## 2020-11-12 DIAGNOSIS — M54.59 MECHANICAL LOW BACK PAIN: ICD-10-CM

## 2020-11-12 DIAGNOSIS — M54.16 LUMBAR RADICULOPATHY: Primary | ICD-10-CM

## 2020-11-12 PROCEDURE — 1090F PRES/ABSN URINE INCON ASSESS: CPT | Performed by: PHYSICAL MEDICINE & REHABILITATION

## 2020-11-12 PROCEDURE — 99213 OFFICE O/P EST LOW 20 MIN: CPT | Performed by: PHYSICAL MEDICINE & REHABILITATION

## 2020-11-12 PROCEDURE — G9899 SCRN MAM PERF RSLTS DOC: HCPCS | Performed by: PHYSICAL MEDICINE & REHABILITATION

## 2020-11-12 PROCEDURE — G8432 DEP SCR NOT DOC, RNG: HCPCS | Performed by: PHYSICAL MEDICINE & REHABILITATION

## 2020-11-12 PROCEDURE — 3017F COLORECTAL CA SCREEN DOC REV: CPT | Performed by: PHYSICAL MEDICINE & REHABILITATION

## 2020-11-12 PROCEDURE — 1101F PT FALLS ASSESS-DOCD LE1/YR: CPT | Performed by: PHYSICAL MEDICINE & REHABILITATION

## 2020-11-12 PROCEDURE — G8399 PT W/DXA RESULTS DOCUMENT: HCPCS | Performed by: PHYSICAL MEDICINE & REHABILITATION

## 2020-11-12 PROCEDURE — G8427 DOCREV CUR MEDS BY ELIG CLIN: HCPCS | Performed by: PHYSICAL MEDICINE & REHABILITATION

## 2020-11-12 NOTE — PROGRESS NOTES
[address], [address]  [place], [address] [place] [address] Street  Phone: [phone]  Fax: [phone]        Luis Miguel Rajan  : 1950  PCP: Margo Mcgowan MD  2020    PROGRESS NOTE    Consent: Twan Green is a 65 y.o. female who was seen by synchronous (real-time) audio-video technology, and/or her healthcare decision maker, is aware that this patient-initiated, Telehealth encounter on 2020 is a billable service, with coverage as determined by his/her insurance carrier. He/She is aware that he/she may receive a bill and has provided verbal consent to proceed: Yes. The visit was conducted in the office with the patient being in home through a Doxy platform. Visit video time start: 4:14 PM end: 4:30 PM      HISTORY OF PRESENT ILLNESS  Twan Green is a 65 y.o. female  who was seen as a new patient 2020 with c/o chronic low back pain and leg pain. Her issues were progressive. She appeared to have 2 separate issues. One was that she had back and leg pain (R>L) after standing for a period of time. 5-10 minutes was enough to encourage her to sit down. Afterwards, her symptoms appeared to resolve. 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 This was more of an axial pain that caused an episodic sharp pain in her back she felt was consistent with muscle spasm. Her pain at rest was a 4/10. She had no associated weakness or sensory deficit. She had been prescribed muscle relaxers, but had not taken them. She was using tylenol with some benefit. She had some x-rays of the lumbar spine done over a year ago. It reportedly said that there was facet arthropathy and disc degeneration. She increased her Gabapentin at night to 600 mg but continued to take 300 mg during the morning and day. She noticed some improvement, but continued to have pain. She attended PT (2020-2020;  [facility]) with benefit. Pt noted that her symptoms were unchanged in location, but at a lower pain level. She discontinued Gabapentin because she did not want to become reliant on the medications. She occasionally took Tylenol. Lumbar spine MRI dated 8/22/2020 reviewed. Per report, Degenerative malalignment. Thigh level lumbar spondylosis. Multifactorial acquired moderate central canal stenosis at L4/L5. Bilateral moderate to severe foraminal stenosis, also noted at L4/L5. Sacral perineural cysts are identified, benign appearance. Paracentral canal contents, and paravertebral soft tissues unremarkable. Jagdish Pat is seen virtually for f/u. She underwent right L4 TFESI (10/1/2020; Dr. Martha Sands) with significant benefit. Now, she notes that she is having left-sided low back pain that is bothersome and is worse with bending forward. Pain Scale: /10    Treatments patient has tried:  Physical therapy: Yes - 2020  Doing HEP: Yes  Non-opioid medications: Yes  Spinal injections: Yes - Right L4 TFESI (10/1/20) with significant benefit   Spinal surgery- No.   Last Lumbar MRI - 2020       ASSESSMENT  This is a 68year-old female with lumbar pain radiating into the RLE. Her low back and leg pain may be multifactorial. 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 Her RLE paraesthesia is likely due to a right L4 radiculopathy due to severe foraminal stenosis. PLAN  1. Provided lumbar exercises to add to HEP. 2. Referral to PT [facility])  3. She can trial off of Gabapentin. She can call if she needs a refill if she finds it beneficial.    Pt will f/u in 6-8 weeks or sooner as needed. Diagnoses and all orders for this visit:    1. Lumbar radiculopathy  -     REFERRAL TO PHYSICAL THERAPY  -     gabapentin (NEURONTIN) 300 mg capsule; Take 1 Cap by mouth two (2) times a day. Max Daily Amount: 600 mg.    2. Mechanical low back pain  -     REFERRAL TO PHYSICAL THERAPY    3. Myofascial pain  -     REFERRAL TO PHYSICAL THERAPY               PAST MEDICAL HISTORY   Past Medical History:   Diagnosis Date    Dysplasia of one kidney     Hyperlipidemia        Past Surgical History:   Procedure Laterality Date    HX HYSTERECTOMY      HX OOPHORECTOMY     . MEDICATIONS      Current Outpatient Medications   Medication Sig Dispense Refill    sulindac (CLINORIL) 200 mg tablet Take  by mouth two (2) times a day.  cholecalciferol, vitamin D3, (VITAMIN D3 PO) Take  by mouth.  cyanocobalamin, vitamin B-12, (VITAMIN B-12 PO) Take  by mouth.  cranberry fruit extract (cranberry extract) 500 mg tab Take  by mouth.  Lactobacillus acidophilus (PROBIOTIC PO) Take  by mouth daily.  cinnamon bark (CINNAMON PO) Take 1,000 mg by mouth daily.  phenylephrine/acetaminophn/cpm (SINUS TABS PO) Take  by mouth daily.  glucosamine/chondr mercado A sod (OSTEO BI-FLEX PO) Take 2 Tabs by mouth daily.  levocetirizine (XYZAL) 5 mg tablet Take 5 mg by mouth daily as needed for Allergies.  gabapentin (NEURONTIN) 300 mg capsule Take 2 Caps by mouth three (3) times daily. Max Daily Amount: 1,800 mg. (Patient taking differently: Take 300 mg by mouth three (3) times daily. ) 180 Cap 2    baclofen (LIORESAL) 10 mg tablet TAKE 1 TABLET BY MOUTH THREE TIMES DAILY AS NEEDED      colesevelam (WELCHOL) 625 mg tablet       escitalopram oxalate (LEXAPRO) 20 mg tablet       rosuvastatin (CRESTOR) 5 mg tablet Take 2.5 mg by mouth daily.  trimethoprim (TRIMPEX) 100 mg tablet       torsemide (DEMADEX) 10 mg tablet Take 10 mg by mouth as needed.           ALLERGIES  No Known Allergies       SOCIAL HISTORY    Social History     Socioeconomic History    Marital status:      Spouse name: Not on file    Number of children: Not on file    Years of education: Not on file    Highest education level: Not on file   Tobacco Use    Smoking status: Never Smoker    Smokeless tobacco: Never Used   Substance and Sexual Activity    Alcohol use: Never     Frequency: Never    Drug use: Never   Social History Narrative    ** Merged History Encounter **            FAMILY HISTORY  Family History   Problem Relation Age of Onset    Hypertension Mother    Kashmir Parker Hypertension Father     Hypertension Sister     Diabetes Sister     Hypertension Brother     Diabetes Brother          REVIEW OF SYSTEMS  Review of Systems   Constitutional: Negative for chills, fever and weight loss. Respiratory: Negative for shortness of breath. Cardiovascular: Negative for chest pain. Gastrointestinal: Negative for constipation. Negative for fecal incontinence    Genitourinary: Negative for dysuria. Negative for urinary incontinence   Musculoskeletal: Positive for back pain. Skin: Negative for rash. Neurological: Positive for tingling ( RLE). Negative for dizziness, tremors, focal weakness and headaches. Endo/Heme/Allergies: Does not bruise/bleed easily. Psychiatric/Behavioral: The patient does not have insomnia. PHYSICAL EXAMINATION  There were no vitals taken for this visit. Pain Assessment  9/15/2020   Location of Pain Back   Location Modifiers Right   Severity of Pain 5   Quality of Pain Sharp   Quality of Pain Comment -   Duration of Pain Persistent   Frequency of Pain Constant   Aggravating Factors -   Limiting Behavior -   Relieving Factors -   Result of Injury -           -Constitutional: Healthy appearing, well developed, alert, in no acute distress  - Eyes: Conjunctiva clear, lids unremarkable, sclera anicteric  - Ears, nose, mouth, and throat: External inspection head, face, ears and nose identifies normal appearance without obvious deformity.  -Neck: No asymmetry, no masses, trachea is midline, and normal overall appearance.  -Respiratory: Respirations are even and unlabored.    -Musculoskeletal: No cyanosis, clubbing, or edema observed    -Musculoskeletal: Inspection of the following identifies no gross deformity, misalignment, or asymmetry:   -Head/neck   -Spine   -Ribs/pelvis   -Right upper extremity    -Left upper extremity      -Musculoskeletal: Assessment of range of motion of the following identifies no gross limitations:    -Head/neck   -Spine   -Ribs/pelvis   -Right upper extremity    -Left upper extremity      -Skin: Head and neck skin with no lesions or rash  -Neurologic: Cranial nerves 3-12 grossly intact. -Psychiatric: Judgement and insight intact. The limitations of a telemedicine visit including the inability to perform a detailed physical examination may miss significant findings was presented to the patient, and the patient still elected to proceed with the telemedicine visit. RADIOGRAPHS  Lumbar MRI images taken on 8/22/2020 personally reviewed with patient:  Localizing scans show a rotatory scoliosis,  lumbar curvature convex to the  left. Sagittal imaging with mild grade 1 degenerative retrolisthesis, L2 on L3. Alignment is otherwise in the sagittal plane preserved. Vertebral body heights  preserved. Trilevel degenerative disc disease. Bone marrow signal nonpathologic. Contents of sac look normal.Paravertebral soft tissues unremarkable.     T12/L1: Normal     L1/L2: Mild diffuse bulging disc annulus. No significant central canal or  foraminal compromise.     L2/L3: Moderate to advanced generative disc disease with grade 1 degenerative retrolisthesis. Diffuse ventral and dorsal disc bulging. Moderate ventral impression noted on thecal sac from disc bulge component. Left greater than right lateral disc bulge extension, with associated left greater than right moderate soft tissue foraminal compromise.     L3/L4: Mild generative disc disease. Minimal disc bulging. Left-sided foramen  with mild soft tissue foraminal compromise. Right-sided exit foramen patent.     L4/L5: Diffuse bulging disc annulus with flattening ventral aspect of thecal  sac.  There is a component of acquired central canal stenosis at this level with  additional components of central canal constriction from facet and ligamentum  flavum hypertrophy. Lateral disc bulge extension, with right greater than left  moderate to severe foraminal stenosis.     L5/S1: Minimal disc degeneration. Central canal patent. Exit foramina patent.     Several sacral perineural cysts are identified as well, benign appearance.     IMPRESSION  IMPRESSION:     Degenerative malalignment.     Thigh level lumbar spondylosis.     Multifactorial acquired moderate central canal stenosis at L4/L5.     Bilateral moderate to severe foraminal stenosis, also noted at L4/L5.     Sacral perineural cysts are identified, benign appearance.     Paracentral canal contents, and paravertebral soft tissues unremarkable. Lumbar XR images taken on 1/18/19 personally reviewed with patient:  3 views of the lumbar spine obtained. Lumbar dextrocurvature apex L2,  slightly limits exam. Vertebral body heights appear preserved. Multilevel mild  disc space loss and chronic appearing endplate changes. Mild retrolisthesis of  L2 on L3 and L4 and L5, difficult to measure due to obliquity of lateral view. Lumbar lordosis maintained. Lower lumbar facet arthropathy. Multilevel mid to  lower lumbar spinous process abutment.     IMPRESSION  IMPRESSION:     No clearly acute findings. Degenerative changes as above. Mild levocurvature  apex L2. Mild retrolisthesis of L2 on L3 and L4 on L5.  reviewed    Ms. Malena Cheatham has a reminder for a \"due or due soon\" health maintenance. I have asked that she contact her primary care provider for follow-up on this health maintenance.      Pursuant to the emergency declaration under the [facility] [address] and the [employer] and [employer] Act, this Virtual  Visit was conducted, with patient's consent, to reduce the patient's risk of exposure to COVID-19 and provide continuity of care for an established patient. Services were provided through a video synchronous discussion virtually to substitute for in-person clinic visit. CPT Codes 30039-69005 for Established Patients may apply to this Telehealth Visit  Time-based coding, delete if not needed: I spent at least 15 minutes with this established patient, and >50% of the time was spent counseling and/or coordinating care. Written by Syl Marcelo, as dictated by Dr. Michaela Raymundo. I, Dr. Michaela Raymundo, confirm that all documentation is accurate.

## 2020-11-12 NOTE — PATIENT INSTRUCTIONS

## 2020-11-13 RX ORDER — GABAPENTIN 300 MG/1
300 CAPSULE ORAL 2 TIMES DAILY
Qty: 60 CAP | Refills: 5 | Status: SHIPPED | OUTPATIENT
Start: 2020-11-13

## 2020-11-30 ENCOUNTER — HOSPITAL ENCOUNTER (OUTPATIENT)
Dept: PHYSICAL THERAPY | Age: 70
Discharge: HOME | End: 2020-11-30
Payer: MEDICARE

## 2020-11-30 PROCEDURE — 97110 THERAPEUTIC EXERCISES: CPT

## 2020-11-30 PROCEDURE — 97162 PT EVAL MOD COMPLEX 30 MIN: CPT

## 2020-11-30 PROCEDURE — 97535 SELF CARE MNGMENT TRAINING: CPT
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
## 2020-12-04 ENCOUNTER — HOSPITAL ENCOUNTER (OUTPATIENT)
Dept: PHYSICAL THERAPY | Age: 70
Discharge: HOME | End: 2020-12-04
Payer: MEDICARE

## 2020-12-04 PROCEDURE — 97110 THERAPEUTIC EXERCISES: CPT

## 2020-12-04 PROCEDURE — 97530 THERAPEUTIC ACTIVITIES: CPT

## 2020-12-04 NOTE — PROGRESS NOTES
PT DAILY TREATMENT NOTE     Patient Name: Charity Howard  Date:2020  : 1950  [x]  Patient  Verified  Payor: [place] MEDICARE / Plan: [place] MEDICARE PART A & B / Product Type: Medicare /    In time:9:01  Out time:9:43  Total Treatment Time (min): 42  Visit #: 2 of 16    Medicare/BCBS Only   Total Timed Codes (min):  42 1:1 Treatment Time:  42       Treatment Area: Low back pain [M54.5]    SUBJECTIVE  Pain Level (0-10 scale): 0  Any medication changes, allergies to medications, adverse drug reactions, diagnosis change, or new procedure performed?: [x] No    [] Yes (see summary sheet for update)  Subjective functional status/changes:   [] No changes reported  \"I had the injection and Im her to strengthening the muscles. \"    OBJECTIVE    Modality rationale: decrease edema, decrease inflammation, decrease pain, increase tissue extensibility and increase muscle contraction/control to improve the patients ability to perform ALD    Min Type Additional Details    [] Estim:  []Unatt       []IFC  []Premod                        []Other:  []w/ice   []w/heat  Position:  Location:    [] Estim: []Att    []TENS instruct  []NMES                    []Other:  []w/US   []w/ice   []w/heat  Position:  Location:    []  Traction: [] Cervical       []Lumbar                       [] Prone          []Supine                       []Intermittent   []Continuous Lbs:  [] before manual  [] after manual    []  Ultrasound: []Continuous   [] Pulsed                           []1MHz   []3MHz W/cm2:  Location:    []  Iontophoresis with dexamethasone         Location: [] Take home patch   [] In clinic    []  Ice     []  heat  []  Ice massage  []  Laser   []  Anodyne Position:  Location:    []  Laser with stim  []  Other:  Position:  Location:    []  Vasopneumatic Device Pressure:       [] lo [] med [] hi   Temperature: [] lo [] med [] hi   [x] Skin assessment post-treatment:  [x]intact []redness- no adverse reaction    []redness  adverse reaction:      min []Eval                  []Re-Eval       32 min Therapeutic Exercise:  [x] See flow sheet :   Rationale: increase ROM and increase strength to improve the patients ability to perform ADL     10 min Therapeutic Activity:  [x]  See flow sheet :   Rationale: increase ROM, increase strength and improve coordination  to improve the patients ability to perform ADL       min Neuromuscular Re-education:  []  See flow sheet :   Rationale: increase ROM, increase strength, improve coordination, improve balance and increase proprioception  to improve the patients ability to perform ADL      min Manual Therapy: The manual therapy interventions were performed at a separate and distinct time from the therapeutic activities interventions. Rationale: decrease pain, increase ROM, increase tissue extensibility, decrease edema , decrease trigger points and increase postural awareness to      min Gait Training:  ___ feet with ___ device on level surfaces with ___ level of assist   Rationale: With   [] TE   [] TA   [] neuro   [] other: Patient Education: [x] Review HEP    [] Progressed/Changed HEP based on:   [] positioning   [] body mechanics   [] transfers   [] heat/ice application    [] other:      Other Objective/Functional Measures: Balance activity: MSR Eo/EC 2x30\"                                                                                                     SLS 2x30\"     Pain Level (0-10 scale) post treatment: 0    ASSESSMENT/Changes in Function: Pt completed each strengthening there ex fairly well. Pt was challenged with EC MSR and SLS balance activity.     Patient will continue to benefit from skilled PT services to address functional mobility deficits, address ROM deficits, address strength deficits, analyze and address soft tissue restrictions, analyze and cue movement patterns, analyze and modify body mechanics/ergonomics, assess and modify postural abnormalities and instruct in home and community integration to attain remaining goals. [x]  See Plan of Care  []  See progress note/recertification  []  See Discharge Summary         Progress towards goals / Updated goals:  1. Pt will be compliant and I with HEP to help reduce pain and improve abilities to complete ADLs.             EVAL: written hardcopy given and initiated exercises              CURRENT:met 12/4     Long term goals to be completed in 8 weeks  1. Pt will demonstrate 52 FOTO score to improve abilities to functional activities.             EVAL: 40              CURRENT:  2. Pt will report no higher than 1/10 pain to improve patient's ability to complete self care              EVAL: worst 3              CURRENT:  3. Pt will demonstrate at least 4+/5 strength in B LE to improve patient's ability to household chores              EVAL: hip F R 4 L 4-, ext B 4-, knee ext L 4              CURRENT:  4.  Pt will demonstrate at least 10 reps of sit to stand transfers in 30 seconds to improve cardiovascular endurance and decrease fall risk.              EVAL: 3x pain              CURRENT:    PLAN  []  Upgrade activities as tolerated     [x]  Continue plan of care  []  Update interventions per flow sheet       []  Discharge due to:_  []  Other:_      Chad Oneill PTA 12/4/2020  9:12 AM    Future Appointments   Date Time Provider Joann Yates   12/7/2020  8:15 AM Justina Eckert PTA [facility]   12/9/2020  9:00 AM Kavita Duran PTA [facility]   12/14/2020  9:00 AM Merlynn Greenhouse [facility]   12/16/2020  9:00 AM Kavita Duran, PTA [facility]   1/6/2021  9:00 AM Kavita Duran, PTA [facility]   1/8/2021  9:00 AM Merlynn Greenhouse [facility]   1/11/2021  9:00 AM Merlynn Greenhouse [facility]   1/13/2021  9:00 AM Merlynn Greenhouse [facility]

## 2020-12-07 ENCOUNTER — HOSPITAL ENCOUNTER (OUTPATIENT)
Dept: PHYSICAL THERAPY | Age: 70
Discharge: HOME | End: 2020-12-07
Payer: MEDICARE

## 2020-12-07 PROCEDURE — 97110 THERAPEUTIC EXERCISES: CPT

## 2020-12-07 PROCEDURE — 97530 THERAPEUTIC ACTIVITIES: CPT

## 2020-12-07 PROCEDURE — 97112 NEUROMUSCULAR REEDUCATION: CPT

## 2020-12-07 NOTE — PROGRESS NOTES
PT DAILY TREATMENT NOTE     Patient Name: Anu Castillo  Date:2020  : 1950  [x]  Patient  Verified  Payor: [place] MEDICARE / Plan: [place] MEDICARE PART A & B / Product Type: Medicare /    In time:8:15  Out time:8:58  Total Treatment Time (min): 43  Visit #: 3 of 16    Medicare/BCBS Only   Total Timed Codes (min):  43 1:1 Treatment Time:  43       Treatment Area: Low back pain [M54.5]    SUBJECTIVE  Pain Level (0-10 scale): 0  Any medication changes, allergies to medications, adverse drug reactions, diagnosis change, or new procedure performed?: [x] No    [] Yes (see summary sheet for update)  Subjective functional status/changes:   [] No changes reported  No pain.     OBJECTIVE    Modality rationale: decrease edema, decrease inflammation, decrease pain, increase tissue extensibility and increase muscle contraction/control to improve the patients ability to perform ADL    Min Type Additional Details    [] Estim:  []Unatt       []IFC  []Premod                        []Other:  []w/ice   []w/heat  Position:  Location:    [] Estim: []Att    []TENS instruct  []NMES                    []Other:  []w/US   []w/ice   []w/heat  Position:  Location:    []  Traction: [] Cervical       []Lumbar                       [] Prone          []Supine                       []Intermittent   []Continuous Lbs:  [] before manual  [] after manual    []  Ultrasound: []Continuous   [] Pulsed                           []1MHz   []3MHz W/cm2:  Location:    []  Iontophoresis with dexamethasone         Location: [] Take home patch   [] In clinic    []  Ice     []  heat  []  Ice massage  []  Laser   []  Anodyne Position:  Location:    []  Laser with stim  []  Other:  Position:  Location:    []  Vasopneumatic Device Pressure:       [] lo [] med [] hi   Temperature: [] lo [] med [] hi   [x] Skin assessment post-treatment:  [x]intact []redness- no adverse reaction    []redness  adverse reaction:      min []Eval                  []Re-Eval 33 min Therapeutic Exercise:  [x] See flow sheet :   Rationale: increase ROM and increase strength to improve the patients ability to perform ADL     10 min Therapeutic Activity:  [x]  See flow sheet :   Rationale: increase ROM, increase strength and improve coordination  to improve the patients ability to perform ADL      min Neuromuscular Re-education:  []  See flow sheet :   Rationale: increase ROM, increase strength and improve coordination  to improve the patients ability to perform ADL     min Manual Therapy: The manual therapy interventions were performed at a separate and distinct time from the therapeutic activities interventions. Rationale: decrease pain, increase ROM, increase tissue extensibility, decrease edema , decrease trigger points and increase postural awareness to perform ADL      min Gait Training:  ___ feet with ___ device on level surfaces with ___ level of assist   Rationale: With   [x] TE   [] TA   [] neuro   [] other: Patient Education: [x] Review HEP    [] Progressed/Changed HEP based on:   [] positioning   [] body mechanics   [] transfers   [] heat/ice application    [] other:      Other Objective/Functional Measures: no hands with steps   MSR Eo/EC 2x30\"                                                                                                      Pain Level (0-10 scale) post treatment: 0    ASSESSMENT/Changes in Function: Pt was challanged with EC MSR. Pt responded fairly well to each there ex. Pt is progressing towards achieving all goals. Patient will continue to benefit from skilled PT services to address functional mobility deficits, address ROM deficits, address strength deficits, analyze and address soft tissue restrictions, analyze and cue movement patterns, assess and modify postural abnormalities and instruct in home and community integration to attain remaining goals.      [x]  See Plan of Care  []  See progress note/recertification  []  See Discharge Summary         Progress towards goals / Updated goals:  1. Pt will be compliant and I with HEP to help reduce pain and improve abilities to complete ADLs.             EVAL: written hardcopy given and initiated exercises              CURRENT:met 12/4     Long term goals to be completed in 8 weeks  1. Pt will demonstrate 52 FOTO score to improve abilities to functional activities.             EVAL: 40              CURRENT:  2. Pt will report no higher than 1/10 pain to improve patient's ability to complete self care              EVAL: worst 3              CURRENT: 0 12/7  3. Pt will demonstrate at least 4+/5 strength in B LE to improve patient's ability to household chores              EVAL: hip F R 4 L 4-, ext B 4-, knee ext L 4              CURRENT:  4.  Pt will demonstrate at least 10 reps of sit to stand transfers in 30 seconds to improve cardiovascular endurance and decrease fall risk.              EVAL: 3x pain              CURRENT: 2x10   Not timed 12/7    PLAN  []  Upgrade activities as tolerated     []  Continue plan of care  []  Update interventions per flow sheet       []  Discharge due to:_  [x]  Other:_  Time sit to stand BEATRICE Matthews PTA 12/7/2020  8:32 AM    Future Appointments   Date Time Provider Joann Yates   12/9/2020  9:00 AM Joaquin Oropeza PTA [facility]   12/14/2020  9:00 AM Moniquenusrat Pecks [facility]   12/16/2020  9:00 AM Kavita Duran PTA [facility]   1/6/2021  9:00 AM Kavita Duran PTA [facility]   1/8/2021  9:00 AM Monique Mages [facility]   1/11/2021  9:00 AM Monique Mages [facility]   1/13/2021  9:00 AM Monique Mages [facility]

## 2020-12-09 ENCOUNTER — HOSPITAL ENCOUNTER (OUTPATIENT)
Dept: PHYSICAL THERAPY | Age: 70
Discharge: HOME | End: 2020-12-09
Payer: MEDICARE

## 2020-12-09 PROCEDURE — 97110 THERAPEUTIC EXERCISES: CPT

## 2020-12-09 PROCEDURE — 97112 NEUROMUSCULAR REEDUCATION: CPT

## 2020-12-09 PROCEDURE — 97530 THERAPEUTIC ACTIVITIES: CPT

## 2020-12-09 NOTE — PROGRESS NOTES
PT DAILY TREATMENT NOTE     Patient Name: Lorie Lim  Date:2020  : 1950  [x]  Patient  Verified  Payor: [place] MEDICARE / Plan: [place] MEDICARE PART A & B / Product Type: Medicare /    In time:9:00  Out time:9:40  Total Treatment Time (min): 40  Visit #: 4 of 16    Medicare/BCBS Only   Total Timed Codes (min):  40 1:1 Treatment Time:  40       Treatment Area: Low back pain [M54.5]    SUBJECTIVE  Pain Level (0-10 scale): 0  Any medication changes, allergies to medications, adverse drug reactions, diagnosis change, or new procedure performed?: [x] No    [] Yes (see summary sheet for update)  Subjective functional status/changes:   [] No changes reported   \"No pain. \" \" Im ready to done with this. \"    OBJECTIVE    Modality rationale: decrease edema, decrease inflammation, decrease pain, increase tissue extensibility and increase muscle contraction/control to improve the patients ability to perform ADL    Min Type Additional Details    [] Estim:  []Unatt       []IFC  []Premod                        []Other:  []w/ice   []w/heat  Position:  Location:    [] Estim: []Att    []TENS instruct  []NMES                    []Other:  []w/US   []w/ice   []w/heat  Position:  Location:    []  Traction: [] Cervical       []Lumbar                       [] Prone          []Supine                       []Intermittent   []Continuous Lbs:  [] before manual  [] after manual    []  Ultrasound: []Continuous   [] Pulsed                           []1MHz   []3MHz W/cm2:  Location:    []  Iontophoresis with dexamethasone         Location: [] Take home patch   [] In clinic    []  Ice     []  heat  []  Ice massage  []  Laser   []  Anodyne Position:  Location:    []  Laser with stim  []  Other:  Position:  Location:    []  Vasopneumatic Device Pressure:       [] lo [] med [] hi   Temperature: [] lo [] med [] hi   [x] Skin assessment post-treatment:  [x]intact []redness- no adverse reaction    []redness  adverse reaction:      min []Eval                  []Re-Eval       20 min Therapeutic Exercise:  [x] See flow sheet :   Rationale: increase ROM and increase strength to improve the patients ability to perform ADL     12 min Therapeutic Activity:  [x]  See flow sheet :   Rationale: increase ROM, increase strength and improve coordination  to improve the patients ability to perform ADL      8 min Neuromuscular Re-education:  []  See flow sheet :   Rationale: increase ROM, increase strength, improve coordination, improve balance and increase proprioception  to improve the patients ability to perform ADL     min Manual Therapy: The manual therapy interventions were performed at a separate and distinct time from the therapeutic activities interventions. Rationale: decrease pain, increase ROM, increase tissue extensibility, decrease edema , decrease trigger points and increase postural awareness to perform ADL      min Gait Training:  ___ feet with ___ device on level surfaces with ___ level of assist   Rationale: With   [x] TE   [] TA   [] neuro   [] other: Patient Education: [x] Review HEP    [] Progressed/Changed HEP based on:   [] positioning   [] body mechanics   [] transfers   [] heat/ice application    [] other:      Other Objective/Functional Measures:  Balance activity:  MSR EO/EC 2X30    SLS 3x   Increased rep per flow sheet    Pain Level (0-10 scale) post treatment: 0  ASSESSMENT/Changes in Function: Pt continues to exhibits challenged with SLS and MSR EC. Overall pt responded fairly well with strengthening there ex today. Pt reports she doesn't thing she needs as much PT. Discussed with pt on letting her complete two more  Session and will reassess goals to see if she is ready for D/C.     Patient will continue to benefit from skilled PT services to address functional mobility deficits, address ROM deficits, address strength deficits, analyze and address soft tissue restrictions, analyze and cue movement patterns, analyze and modify body mechanics/ergonomics, assess and modify postural abnormalities and instruct in home and community integration to attain remaining goals. [x]  See Plan of Care  []  See progress note/recertification  []  See Discharge Summary         Progress towards goals / Updated goals:  1. Pt will be compliant and I with HEP to help reduce pain and improve abilities to complete ADLs.             EVAL: written hardcopy given and initiated exercises              CURRENT:met 12/4     Long term goals to be completed in 8 weeks  1. Pt will demonstrate 52 FOTO score to improve abilities to functional activities.             EVAL: 40              CURRENT:  2. Pt will report no higher than 1/10 pain to improve patient's ability to complete self care              EVAL: worst 3              CURRENT: 0 12/7  3. Pt will demonstrate at least 4+/5 strength in B LE to improve patient's ability to household chores              EVAL: hip F R 4 L 4-, ext B 4-, knee ext L 4              CURRENT:  4.  Pt will demonstrate at least 10 reps of sit to stand transfers in 30 seconds to improve cardiovascular endurance and decrease fall risk.              EVAL: 3x pain              CURRENT:11 rep in 30 sec  12/9    PLAN  [x]  Upgrade activities as tolerated     []  Continue plan of care  []  Update interventions per flow sheet       []  Discharge due to:_  []  Other:_  Added 2# PRE's and trial TM NV    Kavita Duran PTA 12/9/2020  9:09 AM    Future Appointments   Date Time Provider Joann Yates   12/14/2020  9:00 AM Maryln Ebbing [facility]   12/16/2020  9:00 AM Kavita Duran PTA [facility]   1/6/2021  9:00 AM Kavita Duran PTA [facility]   1/8/2021  9:00 AM Maryln Ebbing [facility]   1/11/2021  9:00 AM Maryln Ebbing [facility]   1/13/2021  9:00 AM Maryln Ebbing [facility]

## 2020-12-14 ENCOUNTER — HOSPITAL ENCOUNTER (OUTPATIENT)
Dept: PHYSICAL THERAPY | Age: 70
Discharge: HOME | End: 2020-12-14
Payer: MEDICARE

## 2020-12-14 PROCEDURE — 97110 THERAPEUTIC EXERCISES: CPT

## 2020-12-14 PROCEDURE — 97530 THERAPEUTIC ACTIVITIES: CPT

## 2020-12-14 PROCEDURE — 97112 NEUROMUSCULAR REEDUCATION: CPT
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
## 2020-12-14 NOTE — PROGRESS NOTES
Physical Therapy Discharge Instructions    [facility] [address], [address] [place] [address], [facility] [address]  [phone] fax    Patient: Azul Vivar  : 1950    Continue Home Exercise Program   * stretches daily  * strengthening 2x/week  * cardio at least 5x/week (30 min)      Continue with    [] Ice  as needed (20 min)     [x] Heat         Follow up with MD:     [x] Upon completion of therapy     [] As needed  Recommendations:     [x]   Return to activity with home program    []   Return to activity with the following modifications:       []Post Rehab Program    []Join Independent aquatic program     []Return to/join local gym      Additional Comments: Thank you for choosing us for your PT services. It has been a pleasure working with you. Best Wishes!     Juaquin Donaldson 2020 9:15 AM

## 2020-12-16 ENCOUNTER — APPOINTMENT (OUTPATIENT)
Dept: PHYSICAL THERAPY | Age: 70
End: 2020-12-16
Payer: MEDICARE

## 2020-12-21 ENCOUNTER — APPOINTMENT (OUTPATIENT)
Dept: PHYSICAL THERAPY | Age: 70
End: 2020-12-21
Payer: MEDICARE

## 2020-12-23 ENCOUNTER — APPOINTMENT (OUTPATIENT)
Dept: PHYSICAL THERAPY | Age: 70
End: 2020-12-23
Payer: MEDICARE

## 2020-12-28 ENCOUNTER — APPOINTMENT (OUTPATIENT)
Dept: PHYSICAL THERAPY | Age: 70
End: 2020-12-28
Payer: MEDICARE

## 2020-12-30 ENCOUNTER — APPOINTMENT (OUTPATIENT)
Dept: PHYSICAL THERAPY | Age: 70
End: 2020-12-30
Payer: MEDICARE

## 2021-01-06 ENCOUNTER — APPOINTMENT (OUTPATIENT)
Dept: PHYSICAL THERAPY | Age: 71
End: 2021-01-06

## 2021-01-08 ENCOUNTER — APPOINTMENT (OUTPATIENT)
Dept: PHYSICAL THERAPY | Age: 71
End: 2021-01-08

## 2021-01-11 ENCOUNTER — APPOINTMENT (OUTPATIENT)
Dept: PHYSICAL THERAPY | Age: 71
End: 2021-01-11

## 2021-01-13 ENCOUNTER — APPOINTMENT (OUTPATIENT)
Dept: PHYSICAL THERAPY | Age: 71
End: 2021-01-13

## 2021-05-18 ENCOUNTER — APPOINTMENT (OUTPATIENT)
Dept: CT IMAGING | Age: 71
DRG: 176 | End: 2021-05-18
Attending: EMERGENCY MEDICINE
Payer: MEDICARE

## 2021-05-18 ENCOUNTER — APPOINTMENT (OUTPATIENT)
Dept: GENERAL RADIOLOGY | Age: 71
DRG: 176 | End: 2021-05-18
Attending: EMERGENCY MEDICINE
Payer: MEDICARE

## 2021-05-18 ENCOUNTER — HOSPITAL ENCOUNTER (INPATIENT)
Age: 71
LOS: 2 days | Discharge: HOME | DRG: 176 | End: 2021-05-20
Attending: EMERGENCY MEDICINE | Admitting: INTERNAL MEDICINE
Payer: MEDICARE

## 2021-05-18 DIAGNOSIS — I26.99 PULMONARY EMBOLISM WITHOUT ACUTE COR PULMONALE, UNSPECIFIED CHRONICITY, UNSPECIFIED PULMONARY EMBOLISM TYPE (HCC): Primary | ICD-10-CM

## 2021-05-18 DIAGNOSIS — M54.16 LUMBAR RADICULOPATHY: ICD-10-CM

## 2021-05-18 LAB
ALBUMIN SERPL-MCNC: 3.2 G/DL (ref 3.4–5)
ALBUMIN/GLOB SERPL: 0.7 {RATIO} (ref 0.8–1.7)
ALP SERPL-CCNC: 101 U/L (ref 45–117)
ALT SERPL-CCNC: 23 U/L (ref 13–56)
ANION GAP SERPL CALC-SCNC: 9 MMOL/L (ref 3–18)
APPEARANCE UR: CLEAR
APTT PPP: 166.4 SEC (ref 23–36.4)
APTT PPP: 32.4 SEC (ref 23–36.4)
APTT PPP: >180 SEC (ref 23–36.4)
AST SERPL-CCNC: 18 U/L (ref 10–38)
BASOPHILS # BLD: 0 K/UL (ref 0–0.1)
BASOPHILS NFR BLD: 0 % (ref 0–2)
BILIRUB SERPL-MCNC: 0.5 MG/DL (ref 0.2–1)
BILIRUB UR QL: NEGATIVE
BNP SERPL-MCNC: 411 PG/ML (ref 0–900)
BUN SERPL-MCNC: 10 MG/DL (ref 7–18)
BUN/CREAT SERPL: 9 (ref 12–20)
CALCIUM SERPL-MCNC: 8.7 MG/DL (ref 8.5–10.1)
CHLORIDE SERPL-SCNC: 104 MMOL/L (ref 100–111)
CK MB CFR SERPL CALC: NORMAL % (ref 0–4)
CK MB SERPL-MCNC: <1 NG/ML (ref 5–25)
CK SERPL-CCNC: 63 U/L (ref 26–192)
CO2 SERPL-SCNC: 28 MMOL/L (ref 21–32)
COLOR UR: YELLOW
CREAT SERPL-MCNC: 1.08 MG/DL (ref 0.6–1.3)
D DIMER PPP FEU-MCNC: 11.82 UG/ML(FEU)
DIFFERENTIAL METHOD BLD: ABNORMAL
EOSINOPHIL # BLD: 0.2 K/UL (ref 0–0.4)
EOSINOPHIL NFR BLD: 1 % (ref 0–5)
ERYTHROCYTE [DISTWIDTH] IN BLOOD BY AUTOMATED COUNT: 13.2 % (ref 11.6–14.5)
GLOBULIN SER CALC-MCNC: 4.8 G/DL (ref 2–4)
GLUCOSE SERPL-MCNC: 158 MG/DL (ref 74–99)
GLUCOSE UR STRIP.AUTO-MCNC: NEGATIVE MG/DL
HCT VFR BLD AUTO: 38.9 % (ref 35–45)
HGB BLD-MCNC: 12.7 G/DL (ref 12–16)
HGB UR QL STRIP: NEGATIVE
INR PPP: 1 (ref 0.8–1.2)
KETONES UR QL STRIP.AUTO: NEGATIVE MG/DL
LACTATE BLD-SCNC: 1.16 MMOL/L (ref 0.4–2)
LEUKOCYTE ESTERASE UR QL STRIP.AUTO: NEGATIVE
LIPASE SERPL-CCNC: 86 U/L (ref 73–393)
LYMPHOCYTES # BLD: 1.9 K/UL (ref 0.9–3.6)
LYMPHOCYTES NFR BLD: 11 % (ref 21–52)
MCH RBC QN AUTO: 30.9 PG (ref 24–34)
MCHC RBC AUTO-ENTMCNC: 32.6 G/DL (ref 31–37)
MCV RBC AUTO: 94.6 FL (ref 74–97)
MONOCYTES # BLD: 2.1 K/UL (ref 0.05–1.2)
MONOCYTES NFR BLD: 12 % (ref 3–10)
NEUTS SEG # BLD: 13.3 K/UL (ref 1.8–8)
NEUTS SEG NFR BLD: 76 % (ref 40–73)
NITRITE UR QL STRIP.AUTO: NEGATIVE
PH UR STRIP: 5.5 [PH] (ref 5–8)
PLATELET # BLD AUTO: 163 K/UL (ref 135–420)
PLATELET COMMENTS,PCOM: ABNORMAL
PMV BLD AUTO: 12.9 FL (ref 9.2–11.8)
POTASSIUM SERPL-SCNC: 4.6 MMOL/L (ref 3.5–5.5)
PROT SERPL-MCNC: 8 G/DL (ref 6.4–8.2)
PROT UR STRIP-MCNC: NEGATIVE MG/DL
PROTHROMBIN TIME: 12.6 SEC (ref 11.5–15.2)
RBC # BLD AUTO: 4.11 M/UL (ref 4.2–5.3)
RBC MORPH BLD: ABNORMAL
SODIUM SERPL-SCNC: 141 MMOL/L (ref 136–145)
SP GR UR REFRACTOMETRY: >1.03 (ref 1–1.03)
TROPONIN I SERPL-MCNC: 0.02 NG/ML (ref 0–0.04)
UROBILINOGEN UR QL STRIP.AUTO: 0.2 EU/DL (ref 0.2–1)
WBC # BLD AUTO: 17.5 K/UL (ref 4.6–13.2)

## 2021-05-18 PROCEDURE — 82550 ASSAY OF CK (CPK): CPT

## 2021-05-18 PROCEDURE — 94762 N-INVAS EAR/PLS OXIMTRY CONT: CPT

## 2021-05-18 PROCEDURE — 83605 ASSAY OF LACTIC ACID: CPT

## 2021-05-18 PROCEDURE — 85730 THROMBOPLASTIN TIME PARTIAL: CPT

## 2021-05-18 PROCEDURE — 80053 COMPREHEN METABOLIC PANEL: CPT

## 2021-05-18 PROCEDURE — 74174 CTA ABD&PLVS W/CONTRAST: CPT

## 2021-05-18 PROCEDURE — 81003 URINALYSIS AUTO W/O SCOPE: CPT

## 2021-05-18 PROCEDURE — 85610 PROTHROMBIN TIME: CPT

## 2021-05-18 PROCEDURE — 65660000000 HC RM CCU STEPDOWN

## 2021-05-18 PROCEDURE — 93005 ELECTROCARDIOGRAM TRACING: CPT

## 2021-05-18 PROCEDURE — 74011250637 HC RX REV CODE- 250/637: Performed by: INTERNAL MEDICINE

## 2021-05-18 PROCEDURE — 2709999900 HC NON-CHARGEABLE SUPPLY

## 2021-05-18 PROCEDURE — 74011000636 HC RX REV CODE- 636: Performed by: EMERGENCY MEDICINE

## 2021-05-18 PROCEDURE — 36415 COLL VENOUS BLD VENIPUNCTURE: CPT

## 2021-05-18 PROCEDURE — 74011250636 HC RX REV CODE- 250/636: Performed by: EMERGENCY MEDICINE

## 2021-05-18 PROCEDURE — 85025 COMPLETE CBC W/AUTO DIFF WBC: CPT

## 2021-05-18 PROCEDURE — 96375 TX/PRO/DX INJ NEW DRUG ADDON: CPT

## 2021-05-18 PROCEDURE — 96374 THER/PROPH/DIAG INJ IV PUSH: CPT

## 2021-05-18 PROCEDURE — 83880 ASSAY OF NATRIURETIC PEPTIDE: CPT

## 2021-05-18 PROCEDURE — 99223 1ST HOSP IP/OBS HIGH 75: CPT | Performed by: INTERNAL MEDICINE

## 2021-05-18 PROCEDURE — 96376 TX/PRO/DX INJ SAME DRUG ADON: CPT

## 2021-05-18 PROCEDURE — 85379 FIBRIN DEGRADATION QUANT: CPT

## 2021-05-18 PROCEDURE — 83690 ASSAY OF LIPASE: CPT

## 2021-05-18 PROCEDURE — 99285 EMERGENCY DEPT VISIT HI MDM: CPT

## 2021-05-18 RX ORDER — UREA 10 %
2 LOTION (ML) TOPICAL DAILY
Status: DISCONTINUED | OUTPATIENT
Start: 2021-05-19 | End: 2021-05-20 | Stop reason: HOSPADM

## 2021-05-18 RX ORDER — TRIMETHOPRIM 100 MG/1
100 TABLET ORAL DAILY
Status: DISCONTINUED | OUTPATIENT
Start: 2021-05-19 | End: 2021-05-20 | Stop reason: HOSPADM

## 2021-05-18 RX ORDER — ONDANSETRON 2 MG/ML
4 INJECTION INTRAMUSCULAR; INTRAVENOUS
Status: COMPLETED | OUTPATIENT
Start: 2021-05-18 | End: 2021-05-18

## 2021-05-18 RX ORDER — SODIUM CHLORIDE 0.9 % (FLUSH) 0.9 %
5-40 SYRINGE (ML) INJECTION AS NEEDED
Status: DISCONTINUED | OUTPATIENT
Start: 2021-05-18 | End: 2021-05-20 | Stop reason: HOSPADM

## 2021-05-18 RX ORDER — SODIUM CHLORIDE 0.9 % (FLUSH) 0.9 %
5-40 SYRINGE (ML) INJECTION EVERY 8 HOURS
Status: DISCONTINUED | OUTPATIENT
Start: 2021-05-18 | End: 2021-05-20 | Stop reason: HOSPADM

## 2021-05-18 RX ORDER — BACLOFEN 10 MG/1
10 TABLET ORAL 3 TIMES DAILY
Status: DISCONTINUED | OUTPATIENT
Start: 2021-05-18 | End: 2021-05-20 | Stop reason: HOSPADM

## 2021-05-18 RX ORDER — HEPARIN SODIUM 1000 [USP'U]/ML
80 INJECTION, SOLUTION INTRAVENOUS; SUBCUTANEOUS ONCE
Status: COMPLETED | OUTPATIENT
Start: 2021-05-18 | End: 2021-05-18

## 2021-05-18 RX ORDER — MORPHINE SULFATE 2 MG/ML
2 INJECTION, SOLUTION INTRAMUSCULAR; INTRAVENOUS
Status: COMPLETED | OUTPATIENT
Start: 2021-05-18 | End: 2021-05-18

## 2021-05-18 RX ORDER — MORPHINE SULFATE 4 MG/ML
4 INJECTION INTRAVENOUS
Status: COMPLETED | OUTPATIENT
Start: 2021-05-18 | End: 2021-05-18

## 2021-05-18 RX ORDER — ROSUVASTATIN CALCIUM 5 MG/1
2.5 TABLET, COATED ORAL DAILY
Status: DISCONTINUED | OUTPATIENT
Start: 2021-05-19 | End: 2021-05-20 | Stop reason: HOSPADM

## 2021-05-18 RX ORDER — KETOROLAC TROMETHAMINE 15 MG/ML
15 INJECTION, SOLUTION INTRAMUSCULAR; INTRAVENOUS
Status: COMPLETED | OUTPATIENT
Start: 2021-05-18 | End: 2021-05-18

## 2021-05-18 RX ORDER — METRONIDAZOLE 500 MG/1
TABLET ORAL 3 TIMES DAILY
COMMUNITY
End: 2021-05-20

## 2021-05-18 RX ORDER — ESCITALOPRAM OXALATE 10 MG/1
20 TABLET ORAL DAILY
Status: DISCONTINUED | OUTPATIENT
Start: 2021-05-19 | End: 2021-05-20 | Stop reason: HOSPADM

## 2021-05-18 RX ORDER — HEPARIN SODIUM 10000 [USP'U]/100ML
18-36 INJECTION, SOLUTION INTRAVENOUS
Status: DISCONTINUED | OUTPATIENT
Start: 2021-05-18 | End: 2021-05-20

## 2021-05-18 RX ORDER — GABAPENTIN 300 MG/1
300 CAPSULE ORAL 2 TIMES DAILY
Status: DISCONTINUED | OUTPATIENT
Start: 2021-05-18 | End: 2021-05-20 | Stop reason: HOSPADM

## 2021-05-18 RX ORDER — MELATONIN
2000 DAILY
Status: DISCONTINUED | OUTPATIENT
Start: 2021-05-19 | End: 2021-05-20 | Stop reason: HOSPADM

## 2021-05-18 RX ADMIN — KETOROLAC TROMETHAMINE 15 MG: 15 INJECTION, SOLUTION INTRAMUSCULAR; INTRAVENOUS at 12:34

## 2021-05-18 RX ADMIN — Medication 10 ML: at 22:06

## 2021-05-18 RX ADMIN — ONDANSETRON 4 MG: 2 INJECTION INTRAMUSCULAR; INTRAVENOUS at 04:15

## 2021-05-18 RX ADMIN — IOPAMIDOL 95 ML: 755 INJECTION, SOLUTION INTRAVENOUS at 04:03

## 2021-05-18 RX ADMIN — HEPARIN SODIUM 18 UNITS/KG/HR: 10000 INJECTION, SOLUTION INTRAVENOUS at 06:39

## 2021-05-18 RX ADMIN — BACLOFEN 10 MG: 10 TABLET ORAL at 23:04

## 2021-05-18 RX ADMIN — GABAPENTIN 300 MG: 300 CAPSULE ORAL at 23:04

## 2021-05-18 RX ADMIN — MORPHINE SULFATE 4 MG: 4 INJECTION, SOLUTION INTRAMUSCULAR; INTRAVENOUS at 10:16

## 2021-05-18 RX ADMIN — MORPHINE SULFATE 2 MG: 2 INJECTION, SOLUTION INTRAMUSCULAR; INTRAVENOUS at 04:15

## 2021-05-18 RX ADMIN — MORPHINE SULFATE 2 MG: 2 INJECTION, SOLUTION INTRAMUSCULAR; INTRAVENOUS at 05:10

## 2021-05-18 RX ADMIN — HEPARIN SODIUM 6210 UNITS: 1000 INJECTION INTRAVENOUS; SUBCUTANEOUS at 06:35

## 2021-05-18 RX ADMIN — Medication 10 ML: at 23:04

## 2021-05-18 NOTE — ED NOTES
Up to [facility] to attempt to void w/o success. MD aware.  cc bolus started.

## 2021-05-18 NOTE — H&P
History and Physical    Patient: Ivette Roland MRN: [id number]  SSN: [id number]    YOB: 1950  Age: 65 y.o. Sex: female      Paolo Burt MD    C/C : Bilateral PE    Subjective:      Ivette Roland is a 65 y.o. female with past medical history of obesity, recent history of dental work, recent history of severe diarrhea dehydration, history of chronic lower back lumbar area pain admitted after she went to [facility] ED for shortness of breath where she was diagnosed with acute PE and lumbar radiculopathy    Patient is good historian patient's family members including son in room with patient supplementing history    According to patient this morning she developed sudden onset of shortness of breath with severe decrease in her exercise tolerance also associated left-sided chest pain more of a pleuritic nature and increases with cough and deep breathing no hemoptysis no other issues patient is placed on oxygen. Patient had a history of recent dental work done for implant wear according to her local infection was diagnosed she was given prolonged course of antibiotic which resulted in diarrhea probiotic was given now she is free of diarrhea.   Antibiotics use was Augmentin and metronidazole and possible steroid (  I am not very sure about this use of steroid)      Full code    Past Medical History:   Diagnosis Date    Chronic kidney disease     Dysplasia of one kidney     Hyperlipidemia     Stage 3 chronic kidney disease (HCC)      Past Surgical History:   Procedure Laterality Date    HX HYSTERECTOMY      HX OOPHORECTOMY        Family History   Problem Relation Age of Onset    Hypertension Mother     Hypertension Father     Hypertension Sister     Diabetes Sister     Hypertension Brother     Diabetes Brother      Social History     Tobacco Use    Smoking status: Never Smoker    Smokeless tobacco: Never Used   Substance Use Topics    Alcohol use: Never     Frequency: Never Prior to Admission medications    Medication Sig Start Date End Date Taking? Authorizing Provider   sulindac (CLINORIL) 200 mg tablet Take  by mouth two (2) times a day. Yes Provider, Historical   cholecalciferol, vitamin D3, (VITAMIN D3 PO) Take  by mouth. Yes Provider, Historical   cyanocobalamin, vitamin B-12, (VITAMIN B-12 PO) Take  by mouth. Yes Provider, Historical   cranberry fruit extract (cranberry extract) 500 mg tab Take  by mouth. Yes Provider, Historical   Lactobacillus acidophilus (PROBIOTIC PO) Take  by mouth daily. Yes Provider, Historical   cinnamon bark (CINNAMON PO) Take 1,000 mg by mouth daily. Yes Provider, Historical   glucosamine/chondr mercado A sod (OSTEO BI-FLEX PO) Take 2 Tabs by mouth daily. Yes Provider, Historical   [facility]) 625 mg tablet  3/18/20  Yes Provider, Historical   escitalopram oxalate (LEXAPRO) 20 mg tablet  3/6/20  Yes Provider, Historical   rosuvastatin (CRESTOR) 5 mg tablet Take 2.5 mg by mouth daily. 2/8/20  Yes Provider, Historical   gabapentin (NEURONTIN) 300 mg capsule Take 1 Cap by mouth two (2) times a day. Max Daily Amount: 600 mg. 11/13/20   Juel Severs, MD   phenylephrine/acetaminophn/cpm (SINUS TABS PO) Take  by mouth daily. Provider, Historical   torsemide (DEMADEX) 10 mg tablet Take 10 mg by mouth as needed. 7/2/20   Provider, Historical   levocetirizine (XYZAL) 5 mg tablet Take 5 mg by mouth daily as needed for Allergies. Provider, Historical   baclofen (LIORESAL) 10 mg tablet TAKE 1 TABLET BY MOUTH THREE TIMES DAILY AS NEEDED 3/5/20   Provider, Historical   trimethoprim (TRIMPEX) 100 mg tablet  2/8/20   Provider, Historical        No Known Allergies        Review of Systems:  positive responses in bold type   Constitutional: Negative for fever, chills, diaphoresis and unexpected weight change. HENT: Negative for ear pain, congestion, sore throat, rhinorrhea, drooling, trouble swallowing, neck pain and tinnitus.    Eyes: Negative for photophobia, pain, redness and visual disturbance. Respiratory: negative for shortness of breath, cough, choking, chest tightness, wheezing or stridor. Cardiovascular: Negative for chest pain, palpitations and leg swelling. Gastrointestinal: Negative for nausea, vomiting, abdominal pain, diarrhea, constipation, blood in stool, abdominal distention and anal bleeding. Genitourinary: Negative for dysuria, urgency, frequency, hematuria, flank pain and difficulty urinating. Musculoskeletal: Negative for back pain and arthralgias. Skin: Negative for color change, rash and wound. Neurological: Negative for dizziness, seizures, syncope, speech difficulty, light-headedness or headaches. Hematological: Does not bruise/bleed easily. Psychiatric/Behavioral: Negative for suicidal ideas, hallucinations, behavioral problems, self-injury or agitation         Objective: Body mass index is 29.35 kg/m².   Vitals:    05/18/21 1130 05/18/21 1235 05/18/21 1422 05/18/21 1515   BP: (!) 104/46 (!) 119/46 101/63 104/62   Pulse: 80 80 86 87   Resp: 20 22 17 18   Temp:    97.6 °F (36.4 °C)   SpO2: 96% 97% 96% 90%   Weight:       Height:            Physical Exam:  General appearance - alert, well appearing, and in no distress, oriented to person, place, and time and overweight  Mental status - alert, oriented to person, place, and time, normal mood, behavior, speech, dress, motor activity, and thought processes  Eyes - pupils equal and reactive, extraocular eye movements intact  Ears - bilateral TM's and external ear canals normal  Lymphatics - no palpable lymphadenopathy, no hepatosplenomegaly  Chest - rales noted bilateral basal  Heart - normal rate, regular rhythm, normal S1, S2, no murmurs, rubs, clicks or gallops  Abdomen - soft, nontender, nondistended, no masses or organomegaly  Musculoskeletal - no joint tenderness, deformity or swelling  Extremities - peripheral pulses normal, no pedal edema, no clubbing or cyanosis //no calf tenderness         Hospital Problems  Date Reviewed: 11/13/2020          Codes Class Noted POA    Pulmonary embolus ([facility] [address] ICD-10-CM: I26.99  ICD-9-CM: 415.19  5/18/2021 Unknown        Lumbar radiculopathy, acute ICD-10-CM: M54.16  ICD-9-CM: 724.4  5/18/2021 Unknown              CBC:  Lab Results   Component Value Date/Time    WBC 17.5 (H) 05/18/2021 03:15 AM    HGB 12.7 05/18/2021 03:15 AM    HCT 38.9 05/18/2021 03:15 AM    PLATELET 249 55/90/7507 03:15 AM    MCV 94.6 05/18/2021 03:15 AM        CMP:  Lab Results   Component Value Date/Time    Sodium 141 05/18/2021 03:15 AM    Potassium 4.6 05/18/2021 03:15 AM    Chloride 104 05/18/2021 03:15 AM    CO2 28 05/18/2021 03:15 AM    Anion gap 9 05/18/2021 03:15 AM    Glucose 158 (H) 05/18/2021 03:15 AM    BUN 10 05/18/2021 03:15 AM    Creatinine 1.08 05/18/2021 03:15 AM    BUN/Creatinine ratio 9 (L) 05/18/2021 03:15 AM    GFR est AA >60 05/18/2021 03:15 AM    GFR est non-AA 50 (L) 05/18/2021 03:15 AM    Calcium 8.7 05/18/2021 03:15 AM    Alk. phosphatase 101 05/18/2021 03:15 AM    Protein, total 8.0 05/18/2021 03:15 AM    Albumin 3.2 (L) 05/18/2021 03:15 AM    Globulin 4.8 (H) 05/18/2021 03:15 AM    A-G Ratio 0.7 (L) 05/18/2021 03:15 AM    ALT (SGPT) 23 05/18/2021 03:15 AM        PT/INR  Lab Results   Component Value Date/Time    INR 1.0 05/18/2021 03:15 AM    Prothrombin time 12.6 05/18/2021 03:15 AM            EKG: No results found for this or any previous visit.        Assessment And  Plan:     1 bilateral pulmonary emboli without cor pulmonale on CTA  2 lumbar radiculopathy with chronic lumbar pain  3 hyperlipidemia  4 history of solitary kidney chronic UTI suppression with trimethoprim  5 depression    Plan    -Continue telemetry observation and monitoring  -O2 via 2 L nasal cannula to maintain oxygen saturation above 94%  IV heparin-later changed to p.o. medications  -Follow echocardiogram  -Follow DVT study  -Pain management with Tylenol with codeine, if possible avoid NSAIDs however if pleurisy is significant short course of NSAID will be given        Signed By: Yeison Keys MD     May 18, 2021

## 2021-05-18 NOTE — ROUTINE PROCESS
TRANSFER - OUT REPORT: 
 
Verbal report given to Kong Carrizales RN(name) on Susan Soares  being transferred to  [facility], Room 219(unit) for routine progression of care Report consisted of patients Situation, Background, Assessment and  
Recommendations(SBAR). Information from the following report(s) SBAR, Kardex and MAR was reviewed with the receiving nurse. Lines:  
Peripheral IV 05/18/21 Antecubital (Active) Peripheral IV 05/18/21 Right Hand (Active) Site Assessment Clean, dry, & intact 05/18/21 1252 Phlebitis Assessment 0 05/18/21 1252 Infiltration Assessment 0 05/18/21 1252 Dressing Status Clean, dry, & intact 05/18/21 1252 Dressing Type Transparent 05/18/21 1252 Hub Color/Line Status Patent; Flushed;Capped 05/18/21 1252 Opportunity for questions and clarification was provided. Patient transported with: 
 Monitor O2 @ 2 liters

## 2021-05-18 NOTE — ED NOTES
Pt placed on hospital bed for comfort. Pt requesting Tylenol Arthritis. States Morphine not helping with pain. States pain is from arthritis. Dr. Grissom Better made aware.

## 2021-05-18 NOTE — ED PROVIDER NOTES
HPI Patient is a 66 yo male who presents to the ER with C/O having a sudden onset of bilateral severe lower back pain that started two hours ago. No recent trama. Patient has a hx of lumbar radiculopathy. She denies having  hx of  aorta aneurysm, kidney stones, SBO, chronic low back pains, N/V, dysuria. Patient has a hx of lumbar radiculopathy and nerve root block in the past.    Patient also C/O having moderate SOB that started today. She denies having wheezing, chest pain, difficulty breathing.     Past Medical History:   Diagnosis Date    Dysplasia of one kidney     Hyperlipidemia        Past Surgical History:   Procedure Laterality Date    HX HYSTERECTOMY      HX OOPHORECTOMY           Family History:   Problem Relation Age of Onset   Elfreda Noemi Hypertension Mother     Hypertension Father     Hypertension Sister     Diabetes Sister     Hypertension Brother     Diabetes Brother        Social History     Socioeconomic History    Marital status:      Spouse name: Not on file    Number of children: Not on file    Years of education: Not on file    Highest education level: Not on file   Occupational History    Not on file   Social Needs    Financial resource strain: Not on file    Food insecurity     Worry: Not on file     Inability: Not on file    Transportation needs     Medical: Not on file     Non-medical: Not on file   Tobacco Use    Smoking status: Never Smoker    Smokeless tobacco: Never Used   Substance and Sexual Activity    Alcohol use: Never     Frequency: Never    Drug use: Never    Sexual activity: Not on file   Lifestyle    Physical activity     Days per week: Not on file     Minutes per session: Not on file    Stress: Not on file   Relationships    Social connections     Talks on phone: Not on file     Gets together: Not on file     Attends Sabianism service: Not on file     Active member of club or organization: Not on file     Attends meetings of clubs or organizations: Not on file     Relationship status: Not on file    Intimate partner violence     Fear of current or ex partner: Not on file     Emotionally abused: Not on file     Physically abused: Not on file     Forced sexual activity: Not on file   Other Topics Concern    Not on file   Social History Narrative    ** Merged History Encounter **              ALLERGIES: Patient has no known allergies. Review of Systems   Constitutional: Negative. HENT: Negative. Eyes: Negative. Respiratory: Negative. Cardiovascular: Negative. Gastrointestinal: Positive for nausea. Negative for abdominal pain and vomiting. Endocrine: Negative. Negative for cold intolerance. Genitourinary: Negative. Negative for dysuria, flank pain and frequency. Musculoskeletal: Positive for back pain. Negative for arthralgias and myalgias. Skin: Negative. Negative for rash. Allergic/Immunologic: Negative. Neurological: Negative. Negative for dizziness, weakness, light-headedness and headaches. Hematological: Negative. Psychiatric/Behavioral: Negative. Negative for agitation, behavioral problems and confusion. All other systems reviewed and are negative. Vitals:    05/18/21 0316 05/18/21 0319   BP: 127/78    Pulse: (!) 103    Resp:  21   SpO2: 97%    Weight: 74.8 kg (165 lb)    Height: 5' 4\" (1.626 m)             Physical Exam  Vitals signs and nursing note reviewed. Constitutional:       General: She is not in acute distress. Appearance: She is well-developed. She is not diaphoretic. HENT:      Head: Normocephalic. Right Ear: External ear normal.      Left Ear: External ear normal.      Mouth/Throat:      Pharynx: No oropharyngeal exudate. Eyes:      General: No scleral icterus. Right eye: No discharge. Left eye: No discharge. Conjunctiva/sclera: Conjunctivae normal.      Pupils: Pupils are equal, round, and reactive to light.    Neck:      Musculoskeletal: Normal range of motion and neck supple. Thyroid: No thyromegaly. Vascular: No JVD. Trachea: No tracheal deviation. Cardiovascular:      Rate and Rhythm: Normal rate and regular rhythm. Heart sounds: Normal heart sounds. No murmur. No friction rub. No gallop. Pulmonary:      Effort: Pulmonary effort is normal. No respiratory distress. Breath sounds: Normal breath sounds. No stridor. No wheezing or rales. Chest:      Chest wall: No tenderness. Abdominal:      General: Bowel sounds are normal. There is no distension. Palpations: Abdomen is soft. There is no mass. Tenderness: There is no abdominal tenderness. There is no guarding or rebound. Musculoskeletal: Normal range of motion. General: No tenderness. Lymphadenopathy:      Cervical: No cervical adenopathy. Skin:     General: Skin is warm and dry. Coloration: Skin is not pale. Findings: No erythema or rash. Neurological:      Mental Status: She is alert and oriented to person, place, and time. Cranial Nerves: No cranial nerve deficit. Motor: No abnormal muscle tone.       Coordination: Coordination normal.      Deep Tendon Reflexes: Reflexes normal.        MDM  Number of Diagnoses or Management Options  Lumbar radiculopathy: new and requires workup  Pulmonary embolism without acute cor pulmonale, unspecified chronicity, unspecified pulmonary embolism type [facility]): new and requires workup     Amount and/or Complexity of Data Reviewed  Clinical lab tests: ordered and reviewed  Tests in the radiology section of CPT®: ordered and reviewed    Risk of Complications, Morbidity, and/or Mortality  Presenting problems: high  Diagnostic procedures: high  Management options: high    Patient Progress  Patient progress: stable    ED Course as of May 18 0620   Tue May 18, 2021   0416 XR CHEST PORT [KB]   9960 CTA CHEST ABD PELV W CONT [KB]      ED Course User Index  [KB] Ernestina Hodge MD     EKG: interpreted by me - NSR Unchanged from previous EKG    Lab tests interpreted by me - D-dimer 11.4    CT scan of chest: interopreted by - (+) PE      Procedures    Dif Dx: lumbar radiculopathy, aorta dissection, lumbar strain, P SBO. Chronic back pain, pulmonary embolus, pneumonia, MI    Hospital course: Patient had a work-up for pulmonary embolus and aortic aneurysm. CT scan showed patient has multiple pulmonary embolus and the lungs. Case discussed with hospitalist Laney Schreiber MD) patient will be admitted. Patient started on IV heparin. Dx: Acute pulmonary embolus,  lumbar radiculopathy    Disp: admit to telemetry    Dictation disclaimer:  Please note that this dictation was completed with PocketSuite, the computer voice recognition software. Quite often unanticipated grammatical, syntax, homophones, and other interpretive errors are inadvertently transcribed by the computer software. Please disregard these errors. Please excuse any errors that have escaped final proofreading.

## 2021-05-18 NOTE — PROGRESS NOTES
conducted an initial consultation and Spiritual Assessment for Apolinar Day, who is a 65 y.o.,female. Patient's Primary Language is: [place]. According to the patient's EMR Church Affiliation is: Restorationist. The reason the Patient came to the hospital is:   Patient Active Problem List    Diagnosis Date Noted    Pulmonary embolus ([facility] [address] 05/18/2021    Lumbar radiculopathy, acute 05/18/2021        The  provided the following Interventions:  Initiated a relationship of care and support. Provided chaplaincy education. Provided information about Spiritual Care Services. Offered prayer and assurance of continued prayers on patient's behalf. Chart reviewed. The following outcomes where achieved:  Patient shared limited information about both their medical narrative and spiritual journey/beliefs.  confirmed Patient's Church Affiliation. Patient processed feeling about current hospitalization. Patient expressed gratitude for 's visit. Assessment:  Patient does not have any Baptist/cultural needs that will affect patient's preferences in health care. There are no spiritual or Baptist issues which require intervention at this time. Plan:  Chaplains will continue to follow and will provide pastoral care on an as needed/requested basis.  recommends bedside caregivers page  on duty if patient shows signs of acute spiritual or emotional distress.     Renea [address]   [phone]

## 2021-05-19 ENCOUNTER — APPOINTMENT (OUTPATIENT)
Dept: VASCULAR SURGERY | Age: 71
DRG: 176 | End: 2021-05-19
Attending: INTERNAL MEDICINE
Payer: MEDICARE

## 2021-05-19 ENCOUNTER — APPOINTMENT (OUTPATIENT)
Dept: NON INVASIVE DIAGNOSTICS | Age: 71
DRG: 176 | End: 2021-05-19
Attending: INTERNAL MEDICINE
Payer: MEDICARE

## 2021-05-19 LAB
APTT PPP: 113.9 SEC (ref 23–36.4)
APTT PPP: 171.1 SEC (ref 23–36.4)
APTT PPP: 72 SEC (ref 23–36.4)
ATRIAL RATE: 102 BPM
CALCULATED P AXIS, ECG09: 58 DEGREES
CALCULATED R AXIS, ECG10: 7 DEGREES
CALCULATED T AXIS, ECG11: 71 DEGREES
DIAGNOSIS, 93000: NORMAL
ECHO AO ROOT DIAM: 2.87 CM
ECHO LA AREA 4C: 15.57 CM2
ECHO LA VOL 2C: 25.89 ML (ref 22–52)
ECHO LA VOL 4C: 35.73 ML (ref 22–52)
ECHO LA VOL BP: 36.56 ML (ref 22–52)
ECHO LA VOL/BSA BIPLANE: 19.87 ML/M2 (ref 16–28)
ECHO LA VOLUME INDEX A2C: 14.07 ML/M2 (ref 16–28)
ECHO LA VOLUME INDEX A4C: 19.42 ML/M2 (ref 16–28)
ECHO LV E' LATERAL VELOCITY: 8.45 CM/S
ECHO LV E' SEPTAL VELOCITY: 6.67 CM/S
ECHO LV INTERNAL DIMENSION DIASTOLIC: 4.1 CM (ref 3.9–5.3)
ECHO LV INTERNAL DIMENSION SYSTOLIC: 2.85 CM
ECHO LV IVSD: 0.92 CM (ref 0.6–0.9)
ECHO LV MASS 2D: 123.9 G (ref 67–162)
ECHO LV MASS INDEX 2D: 67.3 G/M2 (ref 43–95)
ECHO LV POSTERIOR WALL DIASTOLIC: 0.99 CM (ref 0.6–0.9)
ECHO LVOT CARDIAC OUTPUT: 3.38 LITER/MINUTE
ECHO LVOT DIAM: 1.64 CM
ECHO LVOT PEAK GRADIENT: 4.92 MMHG
ECHO LVOT PEAK VELOCITY: 110.88 CM/S
ECHO LVOT SV: 42.6 ML
ECHO LVOT VTI: 20.21 CM
ECHO MV A VELOCITY: 87.23 CM/S
ECHO MV E DECELERATION TIME (DT): 116.19 MS
ECHO MV E VELOCITY: 70.06 CM/S
ECHO MV E/A RATIO: 0.8
ECHO MV E/E' LATERAL: 8.29
ECHO MV E/E' RATIO (AVERAGED): 9.4
ECHO MV E/E' SEPTAL: 10.5
ECHO RV TAPSE: 1.65 CM (ref 1.5–2)
ECHO TV REGURGITANT MAX VELOCITY: 262.94 CM/S
ECHO TV REGURGITANT PEAK GRADIENT: 27.66 MMHG
LVOT MG: 2.6 MMHG
P-R INTERVAL, ECG05: 168 MS
Q-T INTERVAL, ECG07: 338 MS
QRS DURATION, ECG06: 84 MS
QTC CALCULATION (BEZET), ECG08: 440 MS
VENTRICULAR RATE, ECG03: 102 BPM

## 2021-05-19 PROCEDURE — 74011250637 HC RX REV CODE- 250/637: Performed by: INTERNAL MEDICINE

## 2021-05-19 PROCEDURE — 74011250636 HC RX REV CODE- 250/636

## 2021-05-19 PROCEDURE — 93306 TTE W/DOPPLER COMPLETE: CPT

## 2021-05-19 PROCEDURE — 2709999900 HC NON-CHARGEABLE SUPPLY

## 2021-05-19 PROCEDURE — 74011250636 HC RX REV CODE- 250/636: Performed by: INTERNAL MEDICINE

## 2021-05-19 PROCEDURE — 65660000000 HC RM CCU STEPDOWN

## 2021-05-19 PROCEDURE — 77030027138 HC INCENT SPIROMETER -A

## 2021-05-19 PROCEDURE — 36415 COLL VENOUS BLD VENIPUNCTURE: CPT

## 2021-05-19 PROCEDURE — 93970 EXTREMITY STUDY: CPT

## 2021-05-19 PROCEDURE — 99232 SBSQ HOSP IP/OBS MODERATE 35: CPT | Performed by: INTERNAL MEDICINE

## 2021-05-19 PROCEDURE — 85730 THROMBOPLASTIN TIME PARTIAL: CPT

## 2021-05-19 RX ORDER — HEPARIN SODIUM 1000 [USP'U]/ML
INJECTION, SOLUTION INTRAVENOUS; SUBCUTANEOUS
Status: COMPLETED
Start: 2021-05-19 | End: 2021-05-19

## 2021-05-19 RX ADMIN — BACLOFEN 10 MG: 10 TABLET ORAL at 17:53

## 2021-05-19 RX ADMIN — BACLOFEN 10 MG: 10 TABLET ORAL at 23:14

## 2021-05-19 RX ADMIN — CHOLECALCIFEROL TAB 25 MCG (1000 UNIT) 2000 UNITS: 25 TAB at 08:37

## 2021-05-19 RX ADMIN — ROSUVASTATIN CALCIUM 2.5 MG: 5 TABLET, COATED ORAL at 08:54

## 2021-05-19 RX ADMIN — GABAPENTIN 300 MG: 300 CAPSULE ORAL at 08:36

## 2021-05-19 RX ADMIN — LACTOBACILLUS TAB 2 TABLET: TAB at 08:36

## 2021-05-19 RX ADMIN — Medication 10 ML: at 23:15

## 2021-05-19 RX ADMIN — HEPARIN SODIUM 12 UNITS/KG/HR: 10000 INJECTION, SOLUTION INTRAVENOUS at 17:47

## 2021-05-19 RX ADMIN — GABAPENTIN 300 MG: 300 CAPSULE ORAL at 17:53

## 2021-05-19 RX ADMIN — ESCITALOPRAM OXALATE 20 MG: 10 TABLET ORAL at 08:37

## 2021-05-19 RX ADMIN — Medication 10 ML: at 06:09

## 2021-05-19 RX ADMIN — BACLOFEN 10 MG: 10 TABLET ORAL at 08:37

## 2021-05-19 RX ADMIN — Medication 10 ML: at 17:53

## 2021-05-19 RX ADMIN — HEPARIN SODIUM 3000 UNITS: 1000 INJECTION INTRAVENOUS; SUBCUTANEOUS at 17:49

## 2021-05-19 NOTE — PROGRESS NOTES
Bedside report given to [facility].  Pt resting quietly in bed at this time, call bell within reach.

## 2021-05-19 NOTE — PROGRESS NOTES
Reason for Admission:  Pulmonary embolus ([facility] [address] [I26.99]  Lumbar radiculopathy, acute [M54.16]  Pulmonary embolism ([address] [I26.99]                 RUR Score:    14            Plan for utilizing home health:    Tbd. Independent at baseline                      Likelihood of Readmission:   LOW                         Transition of Care Plan:              Initial assessment completed with patient. Cognitive status of patient: oriented to time, place, person and situation. Face sheet information confirmed:  yes. The patient designates son to participate in her discharge plan and to receive any needed information. This patient lives in a single family home with other:  alone. Patient is able to navigate steps as needed. Prior to hospitalization, patient was considered to be independent with ADLs/IADLS : yes . Patient has a current ACP document on file: yes      Healthcare Decision Maker:     Click here to complete [address] including selection of the Healthcare Decision Maker Relationship (ie \"Primary\")    The son will be available to transport patient home upon discharge. The patient already has none reported, and  medical equipment available in the home. Patient is not currently active with home health. .  Patient has not stayed in a skilled nursing facility or rehab. Was  stay within last 60 days : no. This patient is on dialysis :no      . Currently, the discharge plan is Home. The patient states that she can obtain her medications from the pharmacy, and take her medications as directed. Patient's current insurance is Medicare and [address] [place] [address] Management Interventions  PCP Verified by CM:  Yes  Mode of Transport at Discharge: Self (son)  Transition of Care Consult (CM Consult): Discharge Planning  Current Support Network: Lives Alone  Confirm Follow Up Transport: Self  The Plan for Transition of Care is Related to the Following Treatment Goals : home  Discharge Location  Discharge Placement: Home        Kerri Funk RN BSN  Outcomes Manager    Pager # [phone]

## 2021-05-19 NOTE — PROGRESS NOTES
Problem: Falls - Risk of  Goal: *Absence of Falls  Description: Document Laurel Herrera Fall Risk and appropriate interventions in the flowsheet.   Outcome: Progressing Towards Goal  Note: Fall Risk Interventions:            Medication Interventions: Bed/chair exit alarm, Evaluate medications/consider consulting pharmacy, Patient to call before getting OOB, Teach patient to arise slowly    Elimination Interventions: Bed/chair exit alarm, Call light in reach, Patient to call for help with toileting needs, Stay With Me (per policy), Toilet paper/wipes in reach, Toileting schedule/hourly rounds              Problem: Patient Education: Go to Patient Education Activity  Goal: Patient/Family Education  Outcome: Progressing Towards Goal     Problem: Pulmonary Embolism Care Plan (Adult)  Goal: *Improvement of existing pulmonary embolism  Outcome: Progressing Towards Goal  Goal: *Absence of bleeding  Outcome: Progressing Towards Goal  Goal: *Labs within defined limits  Outcome: Progressing Towards Goal     Problem: Patient Education: Go to Patient Education Activity  Goal: Patient/Family Education  Outcome: Progressing Towards Goal

## 2021-05-19 NOTE — PROGRESS NOTES
[facility] Hospitalist Group  Progress Note    Patient: Naomi Godwin Age: 65 y.o. : 1950 MR#: [id number] SSN: [id number]  Date/Time: 2021     C/C: SOB/Acute PE /Acute DVT       Subjective:   HPI : patient with obesity - SOB and left pleuritic chest pains due to Acute PE , now DVT Lower leg positive for Acute DVT ,           Review of Systems:  positive responses in bold type   Constitutional: Negative for fever, chills, diaphoresis and unexpected weight change. HENT: Negative for ear pain, congestion, sore throat, rhinorrhea, drooling, trouble swallowing, neck pain and tinnitus. Eyes: Negative for photophobia, pain, redness and visual disturbance. Respiratory: negative for shortness of breath, cough, choking, chest tightness, wheezing or stridor. Cardiovascular: Negative for chest pain, palpitations and leg swelling. Gastrointestinal: Negative for nausea, vomiting, abdominal pain, diarrhea, constipation, blood in stool, abdominal distention and anal bleeding. Genitourinary: Negative for dysuria, urgency, frequency, hematuria, flank pain and difficulty urinating. Musculoskeletal: Negative for back pain and arthralgias. Skin: Negative for color change, rash and wound. Neurological: Negative for dizziness, seizures, syncope, speech difficulty, light-headedness or headaches. Hematological: Does not bruise/bleed easily. Psychiatric/Behavioral: Negative for suicidal ideas, hallucinations, behavioral problems, self-injury or agitation     Assessment/Plan:     1.  Acute T E phenomenon - acute PE and Acute right Lower leg DVT   - Likely from severe dehydration following severe diarrhea after use of antibiotic  -Currently on heparin IV changed to Eliquis  -Echocardiogram done pending results    2 improved diarrhea    3 chest pain-pleuritic chest pain improved today    4 improved dyspnea      Time spent on direct patient care >30 mints     Complexity : High complex - due to multiple medical issues outlined above. CODE Status : Full code    Case discussed with:  [x]Patient  [] Family  []Nursing  []Case Management   DVT Prophylaxis:  [x]Eliquis      Objective:   VS:   Visit Vitals  /63 (BP 1 Location: Right upper arm, BP Patient Position: At rest)   Pulse 97   Temp 97.9 °F (36.6 °C)   Resp 20   Ht 5' 4\" (1.626 m)   Wt 78.9 kg (174 lb)   SpO2 95%   BMI 29.87 kg/m²      Tmax/24hrs: Temp (24hrs), Av °F (36.7 °C), Min:97.6 °F (36.4 °C), Max:98.3 °F (36.8 °C)  IOBRIEF    Intake/Output Summary (Last 24 hours) at 2021 1313  Last data filed at 2021 1057  Gross per 24 hour   Intake 360 ml   Output 50 ml   Net 310 ml       General:  Alert, cooperative, no acute distress   HEENT: No facial asymmetry, MYRIAM Pacheco, External ears - WNL    Cardiovascular: S1S2 - regular , No Murmur   Pulmonary: Equal expansion , No Use of accessory muscles , No Rales No Rhonchi    GI:  +BS in all four quadrants, soft, non-tender  Extremities:  No edema; 2+ dorsalis pedis pulses bilaterally  Neuro: Alert and oriented X 2.        Medications:   Current Facility-Administered Medications   Medication Dose Route Frequency    heparin 25,000 units in D5W 250 ml infusion  18-36 Units/kg/hr IntraVENous TITRATE    baclofen (LIORESAL) tablet 10 mg  10 mg Oral TID    cholecalciferol (VITAMIN D3) (1000 Units /25 mcg) tablet 2,000 Units  2,000 Units Oral DAILY    escitalopram oxalate (LEXAPRO) tablet 20 mg  20 mg Oral DAILY    gabapentin (NEURONTIN) capsule 300 mg  300 mg Oral BID    Lactobacillus Acidoph & Bulgar [facility]) tablet 2 Tab  2 Tablet Oral DAILY    rosuvastatin (CRESTOR) tablet 2.5 mg  2.5 mg Oral DAILY    trimethoprim (TRIMPEX) tablet 100 mg  100 mg Oral DAILY    sodium chloride (NS) flush 5-40 mL  5-40 mL IntraVENous Q8H    sodium chloride (NS) flush 5-40 mL  5-40 mL IntraVENous PRN       Labs:    Recent Labs     21  0315   WBC 17.5*   HGB 12.7   HCT 38.9        Recent Labs     05/18/21  0315      K 4.6      CO2 28   *   BUN 10   CREA 1.08   CA 8.7   ALB 3.2*   ALT 23   INR 1.0         Disclaimer: Sections of this note are dictated utilizing voice recognition software, which may have resulted in some phonetic based errors in grammar and contents. Even though attempts were made to correct all the mistakes, some may have been missed, and remained in the body of the document. If questions arise, please contact our department.     Signed By: Keshawn Garcia MD     May 19, 2021

## 2021-05-19 NOTE — PROGRESS NOTES
conducted a Follow up consultation and Spiritual Assessment for Naomi Godwin, who is a 65 y.o.,female. The  provided the following Interventions:  Continued the relationship of care and support. Patient's son was in the room by the bedside. Listened empathically. Patient shared that she's a little tired today after two ultrasounds. Offered prayer and assurance of continued prayer on patient's behalf. Patient received holy communion. Chart reviewed. The following outcomes were achieved:  Patient expressed gratitude for 's visit. Assessment:  There are no further spiritual or Mormon issues which require Spiritual Care Services interventions at this time. Plan:  Chaplains will continue to follow and will provide pastoral care on an as needed/requested basis.  recommends bedside caregivers page  on duty if patient shows signs of acute spiritual or emotional distress.      [facility], [address]   [phone]

## 2021-05-20 VITALS
RESPIRATION RATE: 20 BRPM | BODY MASS INDEX: 29.64 KG/M2 | WEIGHT: 173.6 LBS | TEMPERATURE: 97.5 F | HEIGHT: 64 IN | SYSTOLIC BLOOD PRESSURE: 132 MMHG | DIASTOLIC BLOOD PRESSURE: 59 MMHG | OXYGEN SATURATION: 96 % | HEART RATE: 83 BPM

## 2021-05-20 LAB
APTT PPP: 39 SEC (ref 23–36.4)
APTT PPP: 80.5 SEC (ref 23–36.4)

## 2021-05-20 PROCEDURE — 99239 HOSP IP/OBS DSCHRG MGMT >30: CPT | Performed by: INTERNAL MEDICINE

## 2021-05-20 PROCEDURE — 85730 THROMBOPLASTIN TIME PARTIAL: CPT

## 2021-05-20 PROCEDURE — 74011250637 HC RX REV CODE- 250/637: Performed by: INTERNAL MEDICINE

## 2021-05-20 PROCEDURE — 77010033678 HC OXYGEN DAILY

## 2021-05-20 PROCEDURE — 36415 COLL VENOUS BLD VENIPUNCTURE: CPT

## 2021-05-20 RX ADMIN — GABAPENTIN 300 MG: 300 CAPSULE ORAL at 09:01

## 2021-05-20 RX ADMIN — CHOLECALCIFEROL TAB 25 MCG (1000 UNIT) 2000 UNITS: 25 TAB at 09:01

## 2021-05-20 RX ADMIN — LACTOBACILLUS TAB 2 TABLET: TAB at 09:02

## 2021-05-20 RX ADMIN — BACLOFEN 10 MG: 10 TABLET ORAL at 09:01

## 2021-05-20 RX ADMIN — ESCITALOPRAM OXALATE 20 MG: 10 TABLET ORAL at 09:01

## 2021-05-20 RX ADMIN — APIXABAN 5 MG: 5 TABLET, FILM COATED ORAL at 12:20

## 2021-05-20 RX ADMIN — ROSUVASTATIN CALCIUM 2.5 MG: 5 TABLET, COATED ORAL at 09:01

## 2021-05-20 NOTE — PROGRESS NOTES
Problem: Falls - Risk of  Goal: *Absence of Falls  Description: Document Lear Shaker Fall Risk and appropriate interventions in the flowsheet.   5/20/2021 0051 by Kush Deleon RN  Note: Fall Risk Interventions:            Medication Interventions: Bed/chair exit alarm, Evaluate medications/consider consulting pharmacy, Patient to call before getting OOB, Teach patient to arise slowly    Elimination Interventions: Bed/chair exit alarm, Call light in reach, Patient to call for help with toileting needs, Stay With Me (per policy), Toilet paper/wipes in reach, Toileting schedule/hourly rounds           5/20/2021 0051 by Kush Deleon RN  Outcome: Progressing Towards Goal  Note: Fall Risk Interventions:            Medication Interventions: Bed/chair exit alarm, Evaluate medications/consider consulting pharmacy, Patient to call before getting OOB, Teach patient to arise slowly    Elimination Interventions: Bed/chair exit alarm, Call light in reach, Patient to call for help with toileting needs, Stay With Me (per policy), Toilet paper/wipes in reach, Toileting schedule/hourly rounds              Problem: Patient Education: Go to Patient Education Activity  Goal: Patient/Family Education  Outcome: Progressing Towards Goal
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
## 2021-05-20 NOTE — ROUTINE PROCESS
Bedside and Verbal shift change report given to [address] (oncoming nurse) by Christie Lance RN (offgoing nurse). Report included the following information SBAR, Intake/Output, MAR and Recent Results. Patient resting quietly in bed. Heparin gtt rate verify by the oncoming nurse to the off going nurse. No complaints at this time. Call bell and phone in reach. Bedside commode in reach.

## 2021-05-20 NOTE — PROGRESS NOTES
D/C order noted for today. Orders reviewed. No needs identified at this time. CM remains available if needed. Medicare pt has received, reviewed, and signed 2nd IM letter informing them of their right to appeal the discharge. Signed copy has been placed on pt bedside chart.

## 2021-05-20 NOTE — PROGRESS NOTES
Discharge instructions given. Signature obtained. Pt left unit in no distress with stable vital signs.

## 2021-05-20 NOTE — DISCHARGE INSTRUCTIONS
Patient Education   Patient Education     DISCHARGE SUMMARY from Nurse    PATIENT INSTRUCTIONS:    After general anesthesia or intravenous sedation, for 24 hours or while taking prescription Narcotics:  · Limit your activities  · Do not drive and operate hazardous machinery  · Do not make important personal or business decisions  · Do  not drink alcoholic beverages  · If you have not urinated within 8 hours after discharge, please contact your surgeon on call. Report the following to your surgeon:  · Excessive pain, swelling, redness or odor of or around the surgical area  · Temperature over 100.5  · Nausea and vomiting lasting longer than 4 hours or if unable to take medications  · Any signs of decreased circulation or nerve impairment to extremity: change in color, persistent  numbness, tingling, coldness or increase pain  · Any questions    What to do at Home:  Recommended activity: Activity as tolerated    If you experience any of the following symptoms worsening chest pain, shortness of breath, dizziness, swollen/red/shiny legs or fever >100.5, get to your nearest emergency room or call 911. *  Please give a list of your current medications to your Primary Care Provider. *  Please update this list whenever your medications are discontinued, doses are      changed, or new medications (including over-the-counter products) are added. *  Please carry medication information at all times in case of emergency situations. These are general instructions for a healthy lifestyle:    No smoking/ No tobacco products/ Avoid exposure to second hand smoke  Surgeon General's Warning:  Quitting smoking now greatly reduces serious risk to your health.     Obesity, smoking, and sedentary lifestyle greatly increases your risk for illness    A healthy diet, regular physical exercise & weight monitoring are important for maintaining a healthy lifestyle    You may be retaining fluid if you have a history of heart failure or if you experience any of the following symptoms:  Weight gain of 3 pounds or more overnight or 5 pounds in a week, increased swelling in our hands or feet or shortness of breath while lying flat in bed. Please call your doctor as soon as you notice any of these symptoms; do not wait until your next office visit. The discharge information has been reviewed with the patient. The patient verbalized understanding. Discharge medications reviewed with the patient and appropriate educational materials and side effects teaching were provided. ___________________________________________________________________________________________________________________________________     Pulmonary Embolism: Care Instructions  Overview     Pulmonary embolism is the sudden blockage of an artery in the lung. Blood clots in the deep veins of the leg or pelvis (deep vein thrombosis, or DVT) are the most common cause. These blood clots can travel to the lungs. Pulmonary embolism can be very serious. Because you have had one pulmonary embolism, you are at greater risk for having another one. But you can take steps to prevent another pulmonary embolism by following your doctor's instructions. You will probably take a prescription blood-thinning medicine to prevent blood clots. A blood thinner can stop a blood clot from growing larger and prevent new clots from forming. Follow-up care is a key part of your treatment and safety. Be sure to make and go to all appointments, and call your doctor if you are having problems. It's also a good idea to know your test results and keep a list of the medicines you take. How can you care for yourself at home? · Take your medicines exactly as prescribed. Call your doctor if you think you are having a problem with your medicine. You will get more details on the specific medicines your doctor prescribes.   · If you are taking a blood thinner, be sure you get instructions about how to take your medicine safely. Blood thinners can cause serious bleeding problems. Preventing future pulmonary embolisms  · Exercise. Keep blood moving in your legs to keep clots from forming. If you are traveling by car, stop every hour or so. Get out and walk around for a few minutes. If you are traveling by bus, train, or plane, get out of your seat and walk up and down the aisles every hour or so. You also can do leg exercises while you are seated. Pump your feet up and down by pulling your toes up toward your knees then pointing them down. · Get up out of bed as soon as possible after an illness or surgery. · Do not smoke. If you need help quitting, talk to your doctor about stop-smoking programs and medicines. These can increase your chances of quitting for good. · Check with your doctor before taking hormone or birth control pills. These may increase your risk of blood clots. · Ask your doctor about wearing compression stockings to help prevent blood clots in your legs. There are different types of stockings, and they need to fit right. So your doctor will recommend what you need. When should you call for help? Call 911 anytime you think you may need emergency care. For example, call if:    · You have shortness of breath.     · You have chest pain.     · You passed out (lost consciousness).     · You cough up blood. Call your doctor now or seek immediate medical care if:    · You have new or worsening pain or swelling in your leg. Watch closely for changes in your health, and be sure to contact your doctor if:    · You do not get better as expected. Where can you learn more? Go to http://alton-jai.info/  Enter J348 in the search box to learn more about \"Pulmonary Embolism: Care Instructions. \"  Current as of: March 4, 2020               Content Version: 12.8  © 5299-5871 [employer], [employer].    Care instructions adapted under license by [employer] (which disclaims liability or warranty for this information). If you have questions about a medical condition or this instruction, always ask your healthcare professional. [address] any warranty or liability for your use of this information. Apixaban (By mouth)   Apixaban (a-PIX-a-ban)  Treats and prevents blood clots. This medicine is a blood thinner. Brand Name(s): Eliquis   There may be other brand names for this medicine. When This Medicine Should Not Be Used: This medicine is not right for everyone. Do not use it if you had an allergic reaction to apixaban or you have active bleeding. How to Use This Medicine:   Tablet  · Your doctor will tell you how much medicine to use. Do not use more than directed. · If you are not able to swallow the tablets whole, they may be crushed and mixed in water, 5% dextrose in water (D5W), apple juice, or applesauce. The crushed tablets may be mixed with 60 mL of water or D5W dose and given through a nasogastric tube (NGT). · This medicine should come with a Medication Guide. Ask your pharmacist for a copy if you do not have one. · Missed dose: Take a dose as soon as you remember. If it is almost time for your next dose, wait until then and take a regular dose. Do not take extra medicine to make up for a missed dose. · Store the medicine in a closed container at room temperature, away from heat, moisture, and direct light. Drugs and Foods to Avoid:   Ask your doctor or pharmacist before using any other medicine, including over-the-counter medicines, vitamins, and herbal products. · Some medicines can affect how apixaban works.  Tell your doctor if you are using any of the following:   ¨ Carbamazepine, clarithromycin, itraconazole, ketoconazole, phenytoin, rifampin, ritonavir, Ford's wort  ¨ Blood thinner (including clopidogrel, heparin, prasugrel, warfarin)  ¨ Medicine to treat depression  ¨ NSAID pain or arthritis medicine (including aspirin, celecoxib, diclofenac, ibuprofen, naproxen)  Warnings While Using This Medicine:   · Tell your doctor if you are pregnant or breastfeeding, or if you have kidney disease, liver disease, bleeding problems, or an artificial heart valve. · Do not stop using this medicine suddenly without asking your doctor. You might have a higher risk of stroke for a short time after you stop using this medicine. · This medicine increases your risk for bleeding that can become serious if not controlled. You may also bruise easily, and it may take longer than usual for bleeding to stop. · This medicine may increase your risk for blood clots in your spine or back if you undergo an epidural or spinal puncture. This could lead to paralysis. Tell your doctor if you ever had spine problems or back surgery. · Tell any doctor or dentist who treats you that you are using this medicine. With your doctor's supervision, you may need to stop using this medicine several days before you have surgery or medical tests. · Your doctor will do lab tests at regular visits to check on the effects of this medicine. Keep all appointments. · Keep all medicine out of the reach of children. Never share your medicine with anyone. Possible Side Effects While Using This Medicine:   Call your doctor right away if you notice any of these side effects:  · Allergic reaction: Itching or hives, swelling in your face or hands, swelling or tingling in your mouth or throat, chest tightness, trouble breathing  · Change in how much or how often you urinate, red or pink urine  · Chest pain, trouble breathing  · Coughing up blood, vomiting blood or material that looks like coffee grounds  · Numbness, tingling, or muscle weakness in your legs or feet  · Red or black, tarry stools  · Unusual bleeding, bruising, or weakness  If you notice other side effects that you think are caused by this medicine, tell your doctor.    Call your doctor for medical advice about side effects. You may report side effects to FDA at 8-221-CMC-7429  © 2017 [facility] Information is for End User's use only and may not be sold, redistributed or otherwise used for commercial purposes. The above information is an  only. It is not intended as medical advice for individual conditions or treatments. Talk to your doctor, nurse or pharmacist before following any medical regimen to see if it is safe and effective for you.

## 2021-09-01 ENCOUNTER — TRANSCRIBE ORDER (OUTPATIENT)
Dept: SCHEDULING | Age: 71
End: 2021-09-01

## 2021-09-01 DIAGNOSIS — I82.4Y1 ACUTE EMBOLISM AND THROMBOSIS OF DEEP VEIN OF RIGHT PROXIMAL LOWER EXTREMITY (HCC): Primary | ICD-10-CM

## 2021-09-03 ENCOUNTER — HOSPITAL ENCOUNTER (OUTPATIENT)
Dept: VASCULAR SURGERY | Age: 71
Discharge: HOME | End: 2021-09-03
Attending: FAMILY MEDICINE
Payer: MEDICARE

## 2021-09-03 DIAGNOSIS — I82.4Y1 ACUTE EMBOLISM AND THROMBOSIS OF DEEP VEIN OF RIGHT PROXIMAL LOWER EXTREMITY (HCC): ICD-10-CM

## 2021-09-03 PROCEDURE — 93971 EXTREMITY STUDY: CPT

## 2021-10-14 ENCOUNTER — TRANSCRIBE ORDER (OUTPATIENT)
Dept: SCHEDULING | Age: 71
End: 2021-10-14

## 2021-10-14 DIAGNOSIS — Z12.31 VISIT FOR SCREENING MAMMOGRAM: Primary | ICD-10-CM

## 2021-11-05 ENCOUNTER — HOSPITAL ENCOUNTER (OUTPATIENT)
Dept: MAMMOGRAPHY | Age: 71
Discharge: HOME | End: 2021-11-05
Attending: FAMILY MEDICINE
Payer: MEDICARE

## 2021-11-05 DIAGNOSIS — Z12.31 VISIT FOR SCREENING MAMMOGRAM: ICD-10-CM

## 2021-11-05 PROCEDURE — 77063 BREAST TOMOSYNTHESIS BI: CPT

## 2022-02-17 ENCOUNTER — TRANSCRIBE ORDER (OUTPATIENT)
Dept: SCHEDULING | Age: 72
End: 2022-02-17

## 2022-02-17 DIAGNOSIS — I26.99 PULMONARY EMBOLISM WITH INFARCTION (HCC): Primary | ICD-10-CM

## 2022-02-21 ENCOUNTER — HOSPITAL ENCOUNTER (OUTPATIENT)
Dept: CT IMAGING | Age: 72
Discharge: HOME | End: 2022-02-21
Attending: FAMILY MEDICINE
Payer: MEDICARE

## 2022-02-21 DIAGNOSIS — I26.99 PULMONARY EMBOLISM WITH INFARCTION (HCC): ICD-10-CM

## 2022-02-21 LAB — CREAT UR-MCNC: 1 MG/DL (ref 0.6–1.3)

## 2022-02-21 PROCEDURE — 82565 ASSAY OF CREATININE: CPT

## 2022-02-21 PROCEDURE — 74011000636 HC RX REV CODE- 636

## 2022-02-21 PROCEDURE — 71275 CT ANGIOGRAPHY CHEST: CPT

## 2022-02-21 RX ADMIN — IOPAMIDOL 75 ML: 755 INJECTION, SOLUTION INTRAVENOUS at 11:37

## 2022-03-19 PROBLEM — M54.16 LUMBAR RADICULOPATHY, ACUTE: Status: ACTIVE | Noted: 2021-05-18

## 2022-03-19 PROBLEM — I26.99 PULMONARY EMBOLUS (HCC): Status: ACTIVE | Noted: 2021-05-18

## 2022-03-19 PROBLEM — I26.99 PULMONARY EMBOLISM (HCC): Status: ACTIVE | Noted: 2021-05-18

## 2022-10-17 ENCOUNTER — TRANSCRIBE ORDER (OUTPATIENT)
Dept: SCHEDULING | Age: 72
End: 2022-10-17

## 2022-10-17 DIAGNOSIS — Z12.31 VISIT FOR SCREENING MAMMOGRAM: Primary | ICD-10-CM

## 2022-11-07 ENCOUNTER — HOSPITAL ENCOUNTER (OUTPATIENT)
Dept: MAMMOGRAPHY | Age: 72
Discharge: HOME | End: 2022-11-07
Attending: FAMILY MEDICINE
Payer: MEDICARE

## 2022-11-07 DIAGNOSIS — Z12.31 VISIT FOR SCREENING MAMMOGRAM: ICD-10-CM

## 2022-11-07 PROCEDURE — 77063 BREAST TOMOSYNTHESIS BI: CPT

## 2022-11-14 ENCOUNTER — TRANSCRIBE ORDER (OUTPATIENT)
Dept: SCHEDULING | Age: 72
End: 2022-11-14

## 2022-11-14 DIAGNOSIS — M85.88 OTHER SPECIFIED DISORDERS OF BONE DENSITY AND STRUCTURE, OTHER SITE: Primary | ICD-10-CM

## 2022-11-22 ENCOUNTER — HOSPITAL ENCOUNTER (OUTPATIENT)
Dept: BONE DENSITY | Age: 72
Discharge: HOME | End: 2022-11-22
Attending: FAMILY MEDICINE
Payer: MEDICARE

## 2022-11-22 DIAGNOSIS — M85.88 OTHER SPECIFIED DISORDERS OF BONE DENSITY AND STRUCTURE, OTHER SITE: ICD-10-CM

## 2022-11-22 PROCEDURE — 77080 DXA BONE DENSITY AXIAL: CPT

## 2023-03-27 ENCOUNTER — HOSPITAL ENCOUNTER (OUTPATIENT)
Facility: HOSPITAL | Age: 73
Discharge: HOME | End: 2023-03-30
Payer: MEDICARE

## 2023-03-27 LAB
25(OH)D3 SERPL-MCNC: 52.7 NG/ML (ref 30–100)
ALBUMIN SERPL-MCNC: 3.6 G/DL (ref 3.4–5)
ANION GAP SERPL CALC-SCNC: 5 MMOL/L (ref 3–18)
APPEARANCE UR: ABNORMAL
BACTERIA URNS QL MICRO: ABNORMAL /HPF
BILIRUB UR QL: NEGATIVE
BUN SERPL-MCNC: 14 MG/DL (ref 7–18)
BUN/CREAT SERPL: 15 (ref 12–20)
CALCIUM SERPL-MCNC: 9.2 MG/DL (ref 8.5–10.1)
CALCIUM SERPL-MCNC: 9.3 MG/DL (ref 8.5–10.1)
CHLORIDE SERPL-SCNC: 112 MMOL/L (ref 100–111)
CO2 SERPL-SCNC: 23 MMOL/L (ref 21–32)
COLOR UR: YELLOW
CREAT SERPL-MCNC: 0.91 MG/DL (ref 0.6–1.3)
CREAT UR-MCNC: 164 MG/DL (ref 30–125)
EPITH CASTS URNS QL MICRO: ABNORMAL /LPF (ref 0–5)
GLUCOSE SERPL-MCNC: 109 MG/DL (ref 74–99)
GLUCOSE UR STRIP.AUTO-MCNC: NEGATIVE MG/DL
HGB UR QL STRIP: ABNORMAL
KETONES UR QL STRIP.AUTO: NEGATIVE MG/DL
LEUKOCYTE ESTERASE UR QL STRIP.AUTO: ABNORMAL
MICROALBUMIN UR-MCNC: 52 MG/DL (ref 0–3)
MICROALBUMIN/CREAT UR-RTO: 317 MG/G (ref 0–30)
MUCOUS THREADS URNS QL MICRO: ABNORMAL /LPF
NITRITE UR QL STRIP.AUTO: NEGATIVE
PH UR STRIP: 5.5 (ref 5–8)
PHOSPHATE SERPL-MCNC: 2.9 MG/DL (ref 2.5–4.9)
POTASSIUM SERPL-SCNC: 4.3 MMOL/L (ref 3.5–5.5)
PROT UR STRIP-MCNC: 100 MG/DL
PTH-INTACT SERPL-MCNC: 65.4 PG/ML (ref 18.4–88)
RBC #/AREA URNS HPF: ABNORMAL /HPF (ref 0–5)
SODIUM SERPL-SCNC: 140 MMOL/L (ref 136–145)
SP GR UR REFRACTOMETRY: 1.02 (ref 1–1.03)
UROBILINOGEN UR QL STRIP.AUTO: 0.2 EU/DL (ref 0.2–1)
WBC URNS QL MICRO: ABNORMAL /HPF (ref 0–4)

## 2023-03-27 PROCEDURE — 80069 RENAL FUNCTION PANEL: CPT

## 2023-03-27 PROCEDURE — 82043 UR ALBUMIN QUANTITATIVE: CPT

## 2023-03-27 PROCEDURE — 82306 VITAMIN D 25 HYDROXY: CPT

## 2023-03-27 PROCEDURE — 36415 COLL VENOUS BLD VENIPUNCTURE: CPT

## 2023-03-27 PROCEDURE — 83970 ASSAY OF PARATHORMONE: CPT

## 2023-03-27 PROCEDURE — 81001 URINALYSIS AUTO W/SCOPE: CPT

## 2023-05-15 ENCOUNTER — HOSPITAL ENCOUNTER (OUTPATIENT)
Facility: HOSPITAL | Age: 73
Discharge: HOME | End: 2023-05-18
Payer: MEDICARE

## 2023-05-15 DIAGNOSIS — G89.29 CHRONIC LEFT-SIDED LOW BACK PAIN, UNSPECIFIED WHETHER SCIATICA PRESENT: ICD-10-CM

## 2023-05-15 DIAGNOSIS — M54.50 CHRONIC LEFT-SIDED LOW BACK PAIN, UNSPECIFIED WHETHER SCIATICA PRESENT: ICD-10-CM

## 2023-05-15 PROCEDURE — 72100 X-RAY EXAM L-S SPINE 2/3 VWS: CPT

## 2023-11-16 ENCOUNTER — HOSPITAL ENCOUNTER (OUTPATIENT)
Facility: HOSPITAL | Age: 73
Discharge: HOME | End: 2023-11-16
Payer: MEDICARE

## 2023-11-16 DIAGNOSIS — R06.02 SOB (SHORTNESS OF BREATH) ON EXERTION: ICD-10-CM

## 2023-11-16 PROCEDURE — 71046 X-RAY EXAM CHEST 2 VIEWS: CPT

## 2024-01-26 ENCOUNTER — HOSPITAL ENCOUNTER (OUTPATIENT)
Facility: HOSPITAL | Age: 74
End: 2024-01-26
Attending: FAMILY MEDICINE
Payer: MEDICARE

## 2024-01-26 VITALS — BODY MASS INDEX: 29.62 KG/M2 | HEIGHT: 64 IN | WEIGHT: 173.5 LBS

## 2024-01-26 DIAGNOSIS — Z12.31 VISIT FOR SCREENING MAMMOGRAM: ICD-10-CM

## 2024-01-26 PROCEDURE — 77063 BREAST TOMOSYNTHESIS BI: CPT

## 2024-03-25 ENCOUNTER — HOSPITAL ENCOUNTER (OUTPATIENT)
Facility: HOSPITAL | Age: 74
Discharge: HOME | End: 2024-03-28
Payer: MEDICARE

## 2024-03-25 LAB
25(OH)D3 SERPL-MCNC: 61.2 NG/ML (ref 30–100)
ALBUMIN SERPL-MCNC: 3.8 G/DL (ref 3.4–5)
ANION GAP SERPL CALC-SCNC: 4 MMOL/L (ref 3–18)
BUN SERPL-MCNC: 16 MG/DL (ref 7–18)
BUN/CREAT SERPL: 14 (ref 12–20)
CALCIUM SERPL-MCNC: 10.2 MG/DL (ref 8.5–10.1)
CALCIUM SERPL-MCNC: 9.9 MG/DL (ref 8.5–10.1)
CHLORIDE SERPL-SCNC: 107 MMOL/L (ref 100–111)
CO2 SERPL-SCNC: 28 MMOL/L (ref 21–32)
CREAT SERPL-MCNC: 1.16 MG/DL (ref 0.6–1.3)
CREAT UR-MCNC: 217 MG/DL (ref 30–125)
GLUCOSE SERPL-MCNC: 130 MG/DL (ref 74–99)
MICROALBUMIN UR-MCNC: 1.88 MG/DL (ref 0–3)
MICROALBUMIN/CREAT UR-RTO: 9 MG/G (ref 0–30)
PHOSPHATE SERPL-MCNC: 3.4 MG/DL (ref 2.5–4.9)
POTASSIUM SERPL-SCNC: 4.4 MMOL/L (ref 3.5–5.5)
PTH-INTACT SERPL-MCNC: 26.1 PG/ML (ref 18.4–88)
SODIUM SERPL-SCNC: 139 MMOL/L (ref 136–145)

## 2024-03-25 PROCEDURE — 80069 RENAL FUNCTION PANEL: CPT

## 2024-03-25 PROCEDURE — 82570 ASSAY OF URINE CREATININE: CPT

## 2024-03-25 PROCEDURE — 82043 UR ALBUMIN QUANTITATIVE: CPT

## 2024-03-25 PROCEDURE — 36415 COLL VENOUS BLD VENIPUNCTURE: CPT

## 2024-03-25 PROCEDURE — 82306 VITAMIN D 25 HYDROXY: CPT

## 2024-03-25 PROCEDURE — 83970 ASSAY OF PARATHORMONE: CPT

## 2024-05-21 ENCOUNTER — HOSPITAL ENCOUNTER (OUTPATIENT)
Facility: HOSPITAL | Age: 74
Discharge: HOME | End: 2024-05-24
Payer: MEDICARE

## 2024-05-21 DIAGNOSIS — M25.552 LEFT HIP PAIN: ICD-10-CM

## 2024-05-21 PROCEDURE — 73502 X-RAY EXAM HIP UNI 2-3 VIEWS: CPT

## 2024-07-10 ENCOUNTER — OFFICE VISIT (OUTPATIENT)
Age: 74
End: 2024-07-10
Payer: MEDICARE

## 2024-07-10 DIAGNOSIS — M54.50 LUMBAR PAIN: ICD-10-CM

## 2024-07-10 DIAGNOSIS — M54.59 MECHANICAL LOW BACK PAIN: ICD-10-CM

## 2024-07-10 DIAGNOSIS — M54.16 LUMBAR RADICULOPATHY: Primary | ICD-10-CM

## 2024-07-10 PROCEDURE — 3017F COLORECTAL CA SCREEN DOC REV: CPT | Performed by: PHYSICAL MEDICINE & REHABILITATION

## 2024-07-10 PROCEDURE — 1036F TOBACCO NON-USER: CPT | Performed by: PHYSICAL MEDICINE & REHABILITATION

## 2024-07-10 PROCEDURE — 1090F PRES/ABSN URINE INCON ASSESS: CPT | Performed by: PHYSICAL MEDICINE & REHABILITATION

## 2024-07-10 PROCEDURE — G8427 DOCREV CUR MEDS BY ELIG CLIN: HCPCS | Performed by: PHYSICAL MEDICINE & REHABILITATION

## 2024-07-10 PROCEDURE — 99204 OFFICE O/P NEW MOD 45 MIN: CPT | Performed by: PHYSICAL MEDICINE & REHABILITATION

## 2024-07-10 PROCEDURE — G8419 CALC BMI OUT NRM PARAM NOF/U: HCPCS | Performed by: PHYSICAL MEDICINE & REHABILITATION

## 2024-07-10 PROCEDURE — G8399 PT W/DXA RESULTS DOCUMENT: HCPCS | Performed by: PHYSICAL MEDICINE & REHABILITATION

## 2024-07-10 PROCEDURE — 1123F ACP DISCUSS/DSCN MKR DOCD: CPT | Performed by: PHYSICAL MEDICINE & REHABILITATION

## 2024-07-10 RX ORDER — CYANOCOBALAMIN (VITAMIN B-12) 3000MCG/ML
DROPS SUBLINGUAL
COMMUNITY

## 2024-07-10 RX ORDER — SENNOSIDES 8.6 MG
650 CAPSULE ORAL EVERY 8 HOURS PRN
COMMUNITY

## 2024-07-10 RX ORDER — PREGABALIN 100 MG/1
100 CAPSULE ORAL 2 TIMES DAILY
Qty: 60 CAPSULE | Refills: 2 | Status: SHIPPED | OUTPATIENT
Start: 2024-07-10 | End: 2024-10-08

## 2024-07-10 RX ORDER — PYRIDOXINE HCL (VITAMIN B6) 100 MG
500 TABLET ORAL 2 TIMES DAILY
COMMUNITY

## 2024-07-10 ASSESSMENT — ENCOUNTER SYMPTOMS
VOMITING: 0
SHORTNESS OF BREATH: 0
TROUBLE SWALLOWING: 0
BACK PAIN: 1
NAUSEA: 0
WHEEZING: 0

## 2024-07-12 ENCOUNTER — TELEPHONE (OUTPATIENT)
Age: 74
End: 2024-07-12

## 2024-07-16 RX ORDER — DIAZEPAM 5 MG/1
5 TABLET ORAL ONCE
Status: CANCELLED | OUTPATIENT
Start: 2024-07-18

## 2024-07-16 RX ORDER — DEXAMETHASONE SODIUM PHOSPHATE 10 MG/ML
10 INJECTION, SOLUTION INTRAMUSCULAR; INTRAVENOUS ONCE
Status: CANCELLED | OUTPATIENT
Start: 2024-07-18

## 2024-07-16 RX ORDER — LIDOCAINE HYDROCHLORIDE 10 MG/ML
30 INJECTION, SOLUTION EPIDURAL; INFILTRATION; INTRACAUDAL; PERINEURAL ONCE
Status: CANCELLED | OUTPATIENT
Start: 2024-07-18

## 2024-07-16 RX ORDER — IOPAMIDOL 408 MG/ML
4 INJECTION, SOLUTION INTRATHECAL
Status: CANCELLED | OUTPATIENT
Start: 2024-07-18

## 2024-07-16 RX ORDER — DIAZEPAM 5 MG/1
2.5 TABLET ORAL ONCE
Status: CANCELLED | OUTPATIENT
Start: 2024-07-18

## 2024-07-16 RX ORDER — DIAZEPAM 5 MG/1
10 TABLET ORAL ONCE
Status: CANCELLED | OUTPATIENT
Start: 2024-07-18

## 2024-07-18 ENCOUNTER — HOSPITAL ENCOUNTER (OUTPATIENT)
Facility: HOSPITAL | Age: 74
End: 2024-07-18
Payer: MEDICARE

## 2024-07-18 VITALS
DIASTOLIC BLOOD PRESSURE: 73 MMHG | HEART RATE: 91 BPM | OXYGEN SATURATION: 93 % | TEMPERATURE: 98.4 F | SYSTOLIC BLOOD PRESSURE: 125 MMHG | RESPIRATION RATE: 18 BRPM

## 2024-07-18 PROBLEM — M54.16 LUMBAR RADICULOPATHY: Status: ACTIVE | Noted: 2021-05-18

## 2024-07-18 PROCEDURE — 6360000004 HC RX CONTRAST MEDICATION: Performed by: PHYSICAL MEDICINE & REHABILITATION

## 2024-07-18 PROCEDURE — 2500000003 HC RX 250 WO HCPCS: Performed by: PHYSICAL MEDICINE & REHABILITATION

## 2024-07-18 PROCEDURE — 64483 NJX AA&/STRD TFRM EPI L/S 1: CPT | Performed by: PHYSICAL MEDICINE & REHABILITATION

## 2024-07-18 PROCEDURE — 64483 NJX AA&/STRD TFRM EPI L/S 1: CPT

## 2024-07-18 PROCEDURE — 6360000002 HC RX W HCPCS: Performed by: PHYSICAL MEDICINE & REHABILITATION

## 2024-07-18 PROCEDURE — 6370000000 HC RX 637 (ALT 250 FOR IP): Performed by: PHYSICAL MEDICINE & REHABILITATION

## 2024-07-18 RX ORDER — IOPAMIDOL 408 MG/ML
4 INJECTION, SOLUTION INTRATHECAL
Status: COMPLETED | OUTPATIENT
Start: 2024-07-18 | End: 2024-07-18

## 2024-07-18 RX ORDER — DEXAMETHASONE SODIUM PHOSPHATE 10 MG/ML
10 INJECTION, SOLUTION INTRAMUSCULAR; INTRAVENOUS ONCE
Status: COMPLETED | OUTPATIENT
Start: 2024-07-18 | End: 2024-07-18

## 2024-07-18 RX ORDER — LIDOCAINE HYDROCHLORIDE 10 MG/ML
30 INJECTION, SOLUTION EPIDURAL; INFILTRATION; INTRACAUDAL; PERINEURAL ONCE
Status: COMPLETED | OUTPATIENT
Start: 2024-07-18 | End: 2024-07-18

## 2024-07-18 RX ORDER — SENNOSIDES 8.6 MG
650 CAPSULE ORAL EVERY 8 HOURS PRN
COMMUNITY
Start: 2024-01-23

## 2024-07-18 RX ORDER — DIAZEPAM 5 MG/1
5 TABLET ORAL ONCE
Status: COMPLETED | OUTPATIENT
Start: 2024-07-18 | End: 2024-07-18

## 2024-07-18 RX ORDER — DIAZEPAM 5 MG/1
10 TABLET ORAL ONCE
Status: COMPLETED | OUTPATIENT
Start: 2024-07-18 | End: 2024-07-18

## 2024-07-18 RX ORDER — DIAZEPAM 5 MG/1
2.5 TABLET ORAL ONCE
Status: COMPLETED | OUTPATIENT
Start: 2024-07-18 | End: 2024-07-18

## 2024-07-18 RX ADMIN — DIAZEPAM 5 MG: 5 TABLET ORAL at 14:37

## 2024-07-18 RX ADMIN — LIDOCAINE HYDROCHLORIDE 11 ML: 10 INJECTION, SOLUTION EPIDURAL; INFILTRATION; INTRACAUDAL; PERINEURAL at 15:31

## 2024-07-18 RX ADMIN — IOPAMIDOL 1 ML: 408 INJECTION, SOLUTION INTRATHECAL at 15:33

## 2024-07-18 RX ADMIN — DEXAMETHASONE SODIUM PHOSPHATE 10 MG: 10 INJECTION, SOLUTION INTRAMUSCULAR; INTRAVENOUS at 15:34

## 2024-07-18 ASSESSMENT — PAIN - FUNCTIONAL ASSESSMENT: PAIN_FUNCTIONAL_ASSESSMENT: 0-10

## 2024-07-18 NOTE — OP NOTE
PROCEDURE NOTE      Patient Name: Elzbieta Mathew    Date of Procedure: July 18, 2024    Preoperative Diagnosis:  M54.16    Post Operative Diagnosis:  same    Procedure :    left L4 Selective Nerve Root Block      Consent:  Informed consent was obtained prior to the procedure.  The patient was given the opportunity to ask questions regarding the procedure and its associated risks.  In addition to the potential risks associated with the procedure itself, the patient was informed both verbally and in writing of the potential side effects of the use of glucocorticoid.  The patient appeared to comprehend the informed consent and desired to have the procedure performed.        Procedure:  The patient was placed in the prone position on the fluoroscopy table and the back was prepped and draped in the usual sterile manner.  The exact spinal level was  identified using fluoroscopy, and Lidocaine 1 % was injected locally, a # 22 gauge spinal needle was passed to the transverse process.  The depth was noted and the needle redirected to pass inferior and approximately one cm anterior to the transverse process.    No  1 cc of Isovue M-200 was used to verify positioning in the epidural and paravertebral space and outlined the course of the spinal nerve into the epidural space.  The same procedure was repeated at each spinal level indicated above.    A total of 10 mg of preservative free Dexamethasone and 1 cc of Lidocaine was slowly injected.   The patient tolerated the procedure well.  The injection area was cleaned and bandaids applied.  Not excessive bleeding was noted.   Patient dressed and discharged to home with instructions.    Discussion: The patient tolerated the procedure well.                                              MARTHA FLORES III, MD  July 18, 2024

## 2024-08-27 NOTE — PROGRESS NOTES
lungs in 2023. Pt notes she is now off any blood thinners.   PLAN: Left L4 SNRB; Lyrica 100mg BID      Elzbieta Mathew is a 74 y.o. female was seen today for follow up. Pt presents with improved lumbar pain. Pt notes pain in lateral left hip. Pt sleeps on her side. Pt underwent left L4 SNRB (7/10/24, Dr. Brennan) with benefit. Pt was unable to tolerate morning dose of Lyrica. Pt notes decreased appetite and energy recently. Pt notes some muscle cramps in her legs.      Pain Score:   3/10    PmHx: CKD     reviewed.    REVIEW OF SYSTEMS  Review of Systems   Constitutional:  Negative for fever and unexpected weight change.   HENT:  Negative for trouble swallowing.    Eyes:  Negative for visual disturbance.   Respiratory:  Negative for shortness of breath and wheezing.    Gastrointestinal:  Negative for nausea and vomiting.   Genitourinary:  Negative for enuresis.   Musculoskeletal:  Positive for back pain.   Neurological:  Negative for dizziness and headaches.   Psychiatric/Behavioral:  Negative for sleep disturbance. The patient is not nervous/anxious.      SPINE/MUSCULOSKELETAL EXAM  Back Exam     Tenderness   The patient is experiencing tenderness in the lumbar (greater trocanter and lateral thigh along the IT band).    Range of Motion   Extension:  normal   Flexion:  normal   Rotation right:  normal   Rotation left:  normal     Tests   Straight leg raise right: negative  Straight leg raise left: negative    Reflexes   Patellar:  2/4  Achilles:  2/4  Biceps:  2/4    Other   Sensation: normal        MOTOR:      Elbow Flex  Elbow Ext Arm Abd Wrist Ext Wrist Flex Hand Intrin   Right 5/5 5/5 5/5 5/5 5/5 5/5   Left 5/5 5/5 5/5 5/5 5/5 5/5             Hip flex  Knee Ext EHL Ankle DF Ankle PF      Right 5/5 5/5 5/5 5/5 5/5    Left 5/5 5/5 5/5 5/5 5/5      Ambulation without assistive device.      ASSESSMENT  Elzbieta Mathew is a 74 y.o. female with improved lumbar pain radiating into the LLE. Her low back and leg  Carb-Cholecalciferol 600-12.5 MG-MCG CAPS Take by mouth      Cyanocobalamin (VITAMIN B12) 3000 MCG/ML LIQD Place under the tongue 3 times a week      Cranberry 500 MG CAPS Take 1 capsule by mouth 2 times daily      [place]-Glucosamine-Vit D (OSTEO BI-FLEX ONE PER DAY PO) Take by mouth      acetaminophen (TYLENOL) 650 MG extended release tablet Take 1 tablet by mouth every 8 hours as needed for Pain      CINNAMON PO Take by mouth      Probiotic Product (PROBIOTIC DAILY PO) Take by mouth      pregabalin (LYRICA) 100 MG capsule Take 1 capsule by mouth 2 times daily for 90 days. Max Daily Amount: 200 mg 60 capsule 2    apixaban (ELIQUIS) 5 MG TABS tablet Take 1 tablet by mouth 2 times daily      baclofen (LIORESAL) 10 MG tablet       colesevelam (WELCHOL) 625 MG tablet ceived the following from [facility]: Outside name: colesevelam (WELCHOL) 625 mg tablet      escitalopram (LEXAPRO) 20 MG tablet ceived the following from [facility]: Outside name: escitalopram oxalate (LEXAPRO) 20 mg tablet      gabapentin (NEURONTIN) 300 MG capsule Take 300 mg by mouth 2 times daily. (Patient not taking: Reported on 7/10/2024)      levocetirizine (XYZAL) 5 MG tablet Take 1 tablet by mouth daily as needed      rosuvastatin (CRESTOR) 5 MG tablet Take 0.5 tablets by mouth daily      torsemide (DEMADEX) 10 MG tablet Take 1 tablet by mouth as needed      trimethoprim (TRIMPEX) 100 MG tablet ceived the following from [facility]: Outside name: trimethoprim (TRIMPEX) 100 mg tablet       No current facility-administered medications for this visit.       ALLERGIES    Allergies   Allergen Reactions    Nsaids Other (See Comments)     Pt only has one kidney          SOCIAL HISTORY    Social History     Socioeconomic History    Marital status:      Spouse name: None    Number of children: None    Years of education: None    Highest education level: None   Tobacco Use    Smoking status: Never

## 2024-08-28 ENCOUNTER — OFFICE VISIT (OUTPATIENT)
Age: 74
End: 2024-08-28
Payer: MEDICARE

## 2024-08-28 DIAGNOSIS — M54.16 LUMBAR RADICULOPATHY: Primary | ICD-10-CM

## 2024-08-28 DIAGNOSIS — M54.59 MECHANICAL LOW BACK PAIN: ICD-10-CM

## 2024-08-28 DIAGNOSIS — M54.50 LUMBAR PAIN: ICD-10-CM

## 2024-08-28 PROCEDURE — G8399 PT W/DXA RESULTS DOCUMENT: HCPCS | Performed by: PHYSICAL MEDICINE & REHABILITATION

## 2024-08-28 PROCEDURE — G8427 DOCREV CUR MEDS BY ELIG CLIN: HCPCS | Performed by: PHYSICAL MEDICINE & REHABILITATION

## 2024-08-28 PROCEDURE — 99213 OFFICE O/P EST LOW 20 MIN: CPT | Performed by: PHYSICAL MEDICINE & REHABILITATION

## 2024-08-28 PROCEDURE — G8419 CALC BMI OUT NRM PARAM NOF/U: HCPCS | Performed by: PHYSICAL MEDICINE & REHABILITATION

## 2024-08-28 PROCEDURE — 1090F PRES/ABSN URINE INCON ASSESS: CPT | Performed by: PHYSICAL MEDICINE & REHABILITATION

## 2024-08-28 PROCEDURE — 1123F ACP DISCUSS/DSCN MKR DOCD: CPT | Performed by: PHYSICAL MEDICINE & REHABILITATION

## 2024-08-28 PROCEDURE — 1036F TOBACCO NON-USER: CPT | Performed by: PHYSICAL MEDICINE & REHABILITATION

## 2024-08-28 ASSESSMENT — ENCOUNTER SYMPTOMS
NAUSEA: 0
TROUBLE SWALLOWING: 0
WHEEZING: 0
SHORTNESS OF BREATH: 0
VOMITING: 0
BACK PAIN: 1

## 2024-11-19 ENCOUNTER — HOSPITAL ENCOUNTER (OUTPATIENT)
Facility: HOSPITAL | Age: 74
Discharge: HOME | End: 2024-11-22
Payer: MEDICARE

## 2024-11-19 LAB
BASOPHILS # BLD: 0.1 K/UL (ref 0–0.1)
BASOPHILS NFR BLD: 1 % (ref 0–2)
CHOLEST SERPL-MCNC: 175 MG/DL
DIFFERENTIAL METHOD BLD: ABNORMAL
EOSINOPHIL # BLD: 0.1 K/UL (ref 0–0.4)
EOSINOPHIL NFR BLD: 2 % (ref 0–5)
ERYTHROCYTE [DISTWIDTH] IN BLOOD BY AUTOMATED COUNT: 13.2 % (ref 11.6–14.5)
FOLATE SERPL-MCNC: 14.7 NG/ML (ref 3.1–17.5)
HCT VFR BLD AUTO: 42.2 % (ref 35–45)
HDLC SERPL-MCNC: 81 MG/DL (ref 40–60)
HDLC SERPL: 2.2 (ref 0–5)
HGB BLD-MCNC: 13.1 G/DL (ref 12–16)
IMM GRANULOCYTES # BLD AUTO: 0 K/UL (ref 0–0.04)
IMM GRANULOCYTES NFR BLD AUTO: 0 % (ref 0–0.5)
LDLC SERPL CALC-MCNC: 66.4 MG/DL (ref 0–100)
LIPID PANEL: ABNORMAL
LYMPHOCYTES # BLD: 1.9 K/UL (ref 0.9–3.6)
LYMPHOCYTES NFR BLD: 24 % (ref 21–52)
MCH RBC QN AUTO: 30.8 PG (ref 24–34)
MCHC RBC AUTO-ENTMCNC: 31 G/DL (ref 31–37)
MCV RBC AUTO: 99.1 FL (ref 78–100)
MONOCYTES # BLD: 0.7 K/UL (ref 0.05–1.2)
MONOCYTES NFR BLD: 9 % (ref 3–10)
NEUTS SEG # BLD: 5.1 K/UL (ref 1.8–8)
NEUTS SEG NFR BLD: 65 % (ref 40–73)
NRBC # BLD: 0 K/UL (ref 0–0.01)
NRBC BLD-RTO: 0 PER 100 WBC
PLATELET # BLD AUTO: 218 K/UL (ref 135–420)
PMV BLD AUTO: 12.3 FL (ref 9.2–11.8)
RBC # BLD AUTO: 4.26 M/UL (ref 4.2–5.3)
TRIGL SERPL-MCNC: 138 MG/DL
TSH SERPL DL<=0.05 MIU/L-ACNC: 1.01 UIU/ML (ref 0.36–3.74)
VIT B12 SERPL-MCNC: >2000 PG/ML (ref 211–911)
VLDLC SERPL CALC-MCNC: 27.6 MG/DL
WBC # BLD AUTO: 7.9 K/UL (ref 4.6–13.2)

## 2024-11-19 PROCEDURE — 82607 VITAMIN B-12: CPT

## 2024-11-19 PROCEDURE — 36415 COLL VENOUS BLD VENIPUNCTURE: CPT

## 2024-11-19 PROCEDURE — 85025 COMPLETE CBC W/AUTO DIFF WBC: CPT

## 2024-11-19 PROCEDURE — 82746 ASSAY OF FOLIC ACID SERUM: CPT

## 2024-11-19 PROCEDURE — 80061 LIPID PANEL: CPT

## 2024-11-19 PROCEDURE — 84443 ASSAY THYROID STIM HORMONE: CPT

## 2025-01-16 ENCOUNTER — TRANSCRIBE ORDERS (OUTPATIENT)
Facility: HOSPITAL | Age: 75
End: 2025-01-16

## 2025-01-16 DIAGNOSIS — M81.8 OTHER OSTEOPOROSIS, UNSPECIFIED PATHOLOGICAL FRACTURE PRESENCE: Primary | ICD-10-CM

## 2025-01-20 ENCOUNTER — HOSPITAL ENCOUNTER (OUTPATIENT)
Facility: HOSPITAL | Age: 75
Discharge: HOME | End: 2025-01-23
Attending: FAMILY MEDICINE
Payer: MEDICARE

## 2025-01-20 VITALS — BODY MASS INDEX: 29.77 KG/M2 | HEIGHT: 64 IN

## 2025-01-20 DIAGNOSIS — Z12.31 VISIT FOR SCREENING MAMMOGRAM: ICD-10-CM

## 2025-01-20 DIAGNOSIS — M81.8 OTHER OSTEOPOROSIS, UNSPECIFIED PATHOLOGICAL FRACTURE PRESENCE: ICD-10-CM

## 2025-01-20 PROCEDURE — 77080 DXA BONE DENSITY AXIAL: CPT

## 2025-01-20 PROCEDURE — 77063 BREAST TOMOSYNTHESIS BI: CPT

## 2025-04-02 NOTE — PROGRESS NOTES
[facility]  [facility], [address]  [place] [address]  Phone: [phone]  Fax: [phone]      Elzbieta Mathew  : 1950  PCP: Merced Willoughby MD  4/3/2025    PROGRESS NOTE    HISTORY OF PRESENT ILLNESS    2020  seen as a new patient 2020 with c/o chronic low back pain and leg pain.   Pt notes limited standing tolerance of 5-10 min and finds relief with sitting.   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 This was more of an axial pain that caused an episodic sharp pain in her back she felt was consistent with muscle spasm. Her pain at rest was a 4/10. She had no associated weakness  or sensory deficit. She had been prescribed muscle relaxers, but had not taken them. She was using tylenol with some benefit. She  had some x-rays of the lumbar spine done over a year ago. It reportedly said that there was facet arthropathy and disc degeneration.   She increased her Gabapentin 300mg QAM and 600mg QHS with some increased benefit.   She attended PT  (2020-2020; [facility]) with benefit.   She discontinued Gabapentin  because she did not want to become reliant on the medications.   She occasionally took Tylenol.    Lumbar spine MRI dated 2020 reviewed. Per report, Degenerative malalignment. Thigh level lumbar spondylosis. Multifactorial acquired moderate central canal stenosis at L4/L5. Bilateral moderate  to severe foraminal stenosis, also noted at L4/L5. Sacral perineural cysts are identified, benign appearance. Paracentral canal contents, and paravertebral soft tissues unremarkable.   Underwent right L4 TFESI  (10/1/2020; Dr. Brennan) with significant benefit.   Now, she notes that she is having left-sided low back pain that is bothersome and is worse with bending forward.      7/10/24  C/o lumbar pain radiating down the left leg.   Pt notes history of blood clots in her

## 2025-04-03 ENCOUNTER — OFFICE VISIT (OUTPATIENT)
Age: 75
End: 2025-04-03
Payer: MEDICARE

## 2025-04-03 VITALS — HEIGHT: 64 IN | HEART RATE: 84 BPM | WEIGHT: 185.8 LBS | RESPIRATION RATE: 18 BRPM | BODY MASS INDEX: 31.72 KG/M2

## 2025-04-03 DIAGNOSIS — M54.50 LUMBAR PAIN: ICD-10-CM

## 2025-04-03 DIAGNOSIS — M54.59 MECHANICAL LOW BACK PAIN: ICD-10-CM

## 2025-04-03 DIAGNOSIS — M54.16 LUMBAR RADICULOPATHY: Primary | ICD-10-CM

## 2025-04-03 DIAGNOSIS — M47.816 LUMBAR FACET ARTHROPATHY: ICD-10-CM

## 2025-04-03 PROCEDURE — 1125F AMNT PAIN NOTED PAIN PRSNT: CPT | Performed by: PHYSICAL MEDICINE & REHABILITATION

## 2025-04-03 PROCEDURE — 1090F PRES/ABSN URINE INCON ASSESS: CPT | Performed by: PHYSICAL MEDICINE & REHABILITATION

## 2025-04-03 PROCEDURE — 99213 OFFICE O/P EST LOW 20 MIN: CPT | Performed by: PHYSICAL MEDICINE & REHABILITATION

## 2025-04-03 PROCEDURE — G8399 PT W/DXA RESULTS DOCUMENT: HCPCS | Performed by: PHYSICAL MEDICINE & REHABILITATION

## 2025-04-03 PROCEDURE — 1159F MED LIST DOCD IN RCRD: CPT | Performed by: PHYSICAL MEDICINE & REHABILITATION

## 2025-04-03 PROCEDURE — 1036F TOBACCO NON-USER: CPT | Performed by: PHYSICAL MEDICINE & REHABILITATION

## 2025-04-03 PROCEDURE — 1123F ACP DISCUSS/DSCN MKR DOCD: CPT | Performed by: PHYSICAL MEDICINE & REHABILITATION

## 2025-04-03 PROCEDURE — G8427 DOCREV CUR MEDS BY ELIG CLIN: HCPCS | Performed by: PHYSICAL MEDICINE & REHABILITATION

## 2025-04-03 PROCEDURE — 1160F RVW MEDS BY RX/DR IN RCRD: CPT | Performed by: PHYSICAL MEDICINE & REHABILITATION

## 2025-04-03 PROCEDURE — G8417 CALC BMI ABV UP PARAM F/U: HCPCS | Performed by: PHYSICAL MEDICINE & REHABILITATION

## 2025-04-03 RX ORDER — FLUTICASONE FUROATE AND VILANTEROL 100; 25 UG/1; UG/1
1 POWDER RESPIRATORY (INHALATION) DAILY
COMMUNITY

## 2025-04-03 RX ORDER — PREGABALIN 100 MG/1
100 CAPSULE ORAL 2 TIMES DAILY
Qty: 60 CAPSULE | Refills: 5 | Status: SHIPPED | OUTPATIENT
Start: 2025-04-03 | End: 2025-09-30

## 2025-04-03 ASSESSMENT — ENCOUNTER SYMPTOMS
BACK PAIN: 1
WHEEZING: 0
SHORTNESS OF BREATH: 0
VOMITING: 0
TROUBLE SWALLOWING: 0
NAUSEA: 0

## 2025-05-12 ENCOUNTER — TELEPHONE (OUTPATIENT)
Age: 75
End: 2025-05-12

## 2025-05-12 NOTE — TELEPHONE ENCOUNTER
Patient called stating that she is not tolerating the home exercises well. She states it takes her breath away and she gets shooting pains down her back on both sides when she does them. She is asking if she should continue, and is concerned that they are making her pain worse.    Please review and advise patient, [phone]

## 2025-05-12 NOTE — TELEPHONE ENCOUNTER
Spoke to Dr. Simon in regards to this message and she stop doing the exercises until she is seen by us on 5/20/2025 and we can go to the next treatment option. Patient verbalized understanding and no further action needed at this time.

## 2025-05-16 NOTE — PROGRESS NOTES
Future         PAST MEDICAL HISTORY   Past Medical History:   Diagnosis Date    Chronic kidney disease     Dysplasia of one kidney     Hyperlipidemia     Stage 3 chronic kidney disease (HCC)        Past Surgical History:   Procedure Laterality Date    HYSTERECTOMY (CERVIX STATUS UNKNOWN)      OVARY REMOVAL         MEDICATIONS      Current Outpatient Medications   Medication Sig Dispense Refill    fluticasone furoate-vilanterol (BREO ELLIPTA) 100-25 MCG/ACT inhaler Inhale 1 puff into the lungs daily      acetaminophen (TYLENOL) 650 MG extended release tablet Take 1 tablet by mouth every 8 hours as needed      VITAMIN D, ERGOCALCIFEROL, PO Take by mouth      Cyanocobalamin (VITAMIN B12) 3000 MCG/ML LIQD Place under the tongue 3 times a week      Cranberry 500 MG CAPS Take 1 capsule by mouth 2 times daily      Boswellia-Glucosamine-Vit D (OSTEO BI-FLEX ONE PER DAY PO) Take by mouth      acetaminophen (TYLENOL) 650 MG extended release tablet Take 1 tablet by mouth every 8 hours as needed for Pain      CINNAMON PO Take by mouth      Probiotic Product (PROBIOTIC DAILY PO) Take by mouth      colesevelam (WELCHOL) 625 MG tablet ceived the following from [facility]: Outside name: colesevelam (WELCHOL) 625 mg tablet      escitalopram (LEXAPRO) 20 MG tablet ceived the following from [facility]: Outside name: escitalopram oxalate (LEXAPRO) 20 mg tablet      levocetirizine (XYZAL) 5 MG tablet Take 1 tablet by mouth daily as needed      rosuvastatin (CRESTOR) 5 MG tablet Take 0.5 tablets by mouth daily      trimethoprim (TRIMPEX) 100 MG tablet ceived the following from [facility]: Outside name: trimethoprim (TRIMPEX) 100 mg tablet      pregabalin (LYRICA) 100 MG capsule Take 1 capsule by mouth 2 times daily for 180 days. Max Daily Amount: 200 mg 60 capsule 5    Calcium Carb-Cholecalciferol 600-12.5 MG-MCG CAPS Take by mouth (Patient not taking: Reported on 5/20/2025)

## 2025-05-20 ENCOUNTER — SCHEDULED TELEPHONE ENCOUNTER (OUTPATIENT)
Age: 75
End: 2025-05-20
Payer: MEDICARE

## 2025-05-20 DIAGNOSIS — M54.50 LUMBAR PAIN: ICD-10-CM

## 2025-05-20 DIAGNOSIS — M54.59 MECHANICAL LOW BACK PAIN: ICD-10-CM

## 2025-05-20 DIAGNOSIS — M47.816 LUMBAR FACET ARTHROPATHY: ICD-10-CM

## 2025-05-20 DIAGNOSIS — M54.16 LUMBAR RADICULOPATHY: Primary | ICD-10-CM

## 2025-05-20 PROCEDURE — 1160F RVW MEDS BY RX/DR IN RCRD: CPT | Performed by: PHYSICAL MEDICINE & REHABILITATION

## 2025-05-20 PROCEDURE — 99213 OFFICE O/P EST LOW 20 MIN: CPT | Performed by: PHYSICAL MEDICINE & REHABILITATION

## 2025-05-20 PROCEDURE — 1159F MED LIST DOCD IN RCRD: CPT | Performed by: PHYSICAL MEDICINE & REHABILITATION

## 2025-05-20 PROCEDURE — 1123F ACP DISCUSS/DSCN MKR DOCD: CPT | Performed by: PHYSICAL MEDICINE & REHABILITATION

## 2025-05-20 ASSESSMENT — ENCOUNTER SYMPTOMS
NAUSEA: 0
TROUBLE SWALLOWING: 0
SHORTNESS OF BREATH: 0
VOMITING: 0
WHEEZING: 0
BACK PAIN: 1

## 2025-05-30 ENCOUNTER — HOSPITAL ENCOUNTER (OUTPATIENT)
Age: 75
Discharge: HOME | End: 2025-05-30
Payer: MEDICARE

## 2025-05-30 DIAGNOSIS — M47.816 LUMBAR FACET ARTHROPATHY: ICD-10-CM

## 2025-05-30 DIAGNOSIS — M54.59 MECHANICAL LOW BACK PAIN: ICD-10-CM

## 2025-05-30 DIAGNOSIS — M54.16 LUMBAR RADICULOPATHY: ICD-10-CM

## 2025-05-30 DIAGNOSIS — M54.50 LUMBAR PAIN: ICD-10-CM

## 2025-05-30 PROCEDURE — 72148 MRI LUMBAR SPINE W/O DYE: CPT

## (undated) DEVICE — MEDIA CONTRAST 10ML 200MG/ML 41%

## (undated) DEVICE — (D)BNDG ADHESIVE FABRIC 3/4X3 -- DISC BY MFR USE ITEM 357960

## (undated) DEVICE — TRAY MYEL SFTY +

## (undated) DEVICE — NDL SPNE MANAN 22GX6IN --

## (undated) DEVICE — (D)NDL SPNE 22GX15CM -- DISC BY MFR W/NO SUB

## (undated) DEVICE — SYR 10ML LUER LOK 1/5ML GRAD --